# Patient Record
Sex: FEMALE | Race: WHITE | NOT HISPANIC OR LATINO | URBAN - METROPOLITAN AREA
[De-identification: names, ages, dates, MRNs, and addresses within clinical notes are randomized per-mention and may not be internally consistent; named-entity substitution may affect disease eponyms.]

---

## 2020-07-25 ENCOUNTER — TELEPHONE ENCOUNTER (OUTPATIENT)
Dept: URBAN - METROPOLITAN AREA CLINIC 13 | Facility: CLINIC | Age: 60
End: 2020-07-25

## 2020-07-25 RX ORDER — BIOTIN 2500 MCG
TAKE 1 CAPSULE DAILY CAPSULE ORAL
Refills: 0 | OUTPATIENT
End: 2019-05-22

## 2020-07-25 RX ORDER — GABAPENTIN 300 MG/1
TAKE 1 CAPSULE 3 TIMES DAILY CAPSULE ORAL
Refills: 0 | OUTPATIENT
End: 2016-05-31

## 2020-07-25 RX ORDER — KETOROLAC TROMETHAMINE 10 MG/1
TAKE 1 TABLET EVERY 6 HOURS WITH FOOD TABLET, FILM COATED ORAL
Refills: 0 | OUTPATIENT
End: 2016-04-05

## 2020-07-25 RX ORDER — BACLOFEN 10 MG/1
TAKE 1 TABLET WEEKLY PRN TABLET ORAL
Qty: 3 | Refills: 0 | OUTPATIENT
Start: 2013-05-01 | End: 2016-05-31

## 2020-07-25 RX ORDER — SUCRALFATE 1 G/1
TAKE 1 TABLET 4 TIMES DAILY TABLET ORAL
Refills: 0 | OUTPATIENT
Start: 2013-01-21 | End: 2013-07-02

## 2020-07-25 RX ORDER — HYDROCODONE BITARTRATE AND HOMATROPINE METHYLBROMIDE 5; 1.5 MG/5ML; MG/5ML
SYRUP ORAL
Qty: 240 | Refills: 0 | OUTPATIENT
Start: 2013-06-20 | End: 2015-01-13

## 2020-07-25 RX ORDER — CELECOXIB 50 MG/1
TAKE 1 CAPSULE BEDTIME CAPSULE ORAL
Refills: 0 | OUTPATIENT
End: 2013-07-02

## 2020-07-25 RX ORDER — VARENICLINE TARTRATE 1 MG/1
TAKE 1 TABLET TWICE DAILY TABLET, FILM COATED ORAL
Refills: 0 | OUTPATIENT
End: 2018-07-13

## 2020-07-25 RX ORDER — PANTOPRAZOLE SODIUM 40 MG/1
TAKE 1 TABLET DAILY TABLET, DELAYED RELEASE ORAL
Qty: 30 | Refills: 2 | OUTPATIENT
Start: 2016-05-31 | End: 2018-07-13

## 2020-07-25 RX ORDER — OMEPRAZOLE 20 MG/1
TAKE 1 TABLET DAILY TABLET, DELAYED RELEASE ORAL
Refills: 0 | OUTPATIENT
Start: 2008-06-03 | End: 2009-08-04

## 2020-07-25 RX ORDER — OMEPRAZOLE 20 MG/1
TAKE ONE CAPSULE BY MOUTH TWICE A DAY CAPSULE, DELAYED RELEASE ORAL
Qty: 60 | Refills: 3 | OUTPATIENT
Start: 2012-01-27 | End: 2016-05-31

## 2020-07-25 RX ORDER — MELOXICAM 15 MG/1
TAKE 1 TABLET DAILY AS NEEDED TABLET ORAL
Refills: 0 | OUTPATIENT
End: 2019-05-22

## 2020-07-25 RX ORDER — SACCHAROMYCES BOULARDII 250 MG
USE AS DIRECTED CAPSULE ORAL
Refills: 0 | OUTPATIENT
End: 2018-07-13

## 2020-07-25 RX ORDER — MELOXICAM 15 MG/1
TAKE 1 TABLET DAILY TABLET ORAL
Refills: 0 | OUTPATIENT
Start: 2008-06-03 | End: 2010-03-16

## 2020-07-25 RX ORDER — CALCIUM CITRATE/VITAMIN D3 315MG-6.25
TAKE 1 TABLET  TWICE DAILY TABLET ORAL
Refills: 0 | OUTPATIENT
Start: 2006-08-01 | End: 2016-01-20

## 2020-07-25 RX ORDER — MELOXICAM 15 MG/1
TAKE 1 TABLET DAILY TABLET ORAL
Refills: 0 | OUTPATIENT
Start: 2010-03-16 | End: 2011-09-02

## 2020-07-25 RX ORDER — DICYCLOMINE HYDROCHLORIDE 10 MG/1
TAKE 1 CAPSULE EVERY 6 HOURS AS NEEDED CAPSULE ORAL
Qty: 45 | Refills: 0 | OUTPATIENT
Start: 2016-01-20 | End: 2016-04-05

## 2020-07-25 RX ORDER — CYANOCOBALAMIN (VITAMIN B-12) 1000 MCG
TAKE 1 TABLET DAILY AS DIRECTED TABLET, EXTENDED RELEASE ORAL
Refills: 0 | OUTPATIENT
End: 2016-01-20

## 2020-07-25 RX ORDER — ZOLEDRONIC ACID 5 MG/100ML
USE AS DIRECTED INJECTION, SOLUTION INTRAVENOUS
Refills: 0 | OUTPATIENT
Start: 2013-02-21 | End: 2016-01-20

## 2020-07-25 RX ORDER — KETOROLAC TROMETHAMINE 10 MG/1
TABLET, FILM COATED ORAL
Qty: 30 | Refills: 0 | OUTPATIENT
Start: 2013-05-01 | End: 2015-01-13

## 2020-07-26 ENCOUNTER — TELEPHONE ENCOUNTER (OUTPATIENT)
Dept: URBAN - METROPOLITAN AREA CLINIC 13 | Facility: CLINIC | Age: 60
End: 2020-07-26

## 2020-07-26 RX ORDER — ONDANSETRON HYDROCHLORIDE 4 MG/1
TAKE 1 TABLET BY MOUTH EVERY 8 HOURS AS NEEDED FOR NAUSEA OR VOMITING TABLET, FILM COATED ORAL
Qty: 30 | Refills: 3 | Status: ACTIVE | COMMUNITY
Start: 2019-05-22

## 2020-07-26 RX ORDER — NYSTATIN 100000 [USP'U]/G
APPLY TO AFFECTED AREA 3 TIMES A DAY POWDER TOPICAL
Qty: 15 | Refills: 0 | Status: ACTIVE | COMMUNITY
Start: 2019-05-25

## 2020-07-26 RX ORDER — VANCOMYCIN HYDROCHLORIDE 125 MG/1
TAKE 1 CAPSULE EVERY SIX HOURS FOR 10 DAYS CAPSULE ORAL
Qty: 40 | Refills: 0 | Status: ACTIVE | COMMUNITY
Start: 2019-05-16

## 2020-07-26 RX ORDER — OMEPRAZOLE 40 MG/1
TAKE ONE CAPSULE BY MOUTH EVERY DAY CAPSULE, DELAYED RELEASE ORAL
Qty: 30 | Refills: 2 | Status: ACTIVE | COMMUNITY
Start: 2016-06-08

## 2020-07-26 RX ORDER — ALBUTEROL SULFATE 90 UG/1
AEROSOL, METERED RESPIRATORY (INHALATION)
Qty: 18 | Refills: 0 | Status: ACTIVE | COMMUNITY
Start: 2012-03-06

## 2020-07-26 RX ORDER — DICYCLOMINE HYDROCHLORIDE 20 MG/1
TAKE 1 TABLET EVERY 6-8 HOURS PRN TABLET ORAL
Refills: 0 | Status: ACTIVE | COMMUNITY

## 2020-07-26 RX ORDER — CIPROFLOXACIN HYDROCHLORIDE 500 MG/1
TAKE 1 TABLET TWICE DAILY TABLET, FILM COATED ORAL
Refills: 0 | Status: ACTIVE | COMMUNITY

## 2020-07-26 RX ORDER — PROMETHAZINE HYDROCHLORIDE AND DEXTROMETHORPHAN HYDROBROMIDE 6.25; 15 MG/5ML; MG/5ML
SOLUTION ORAL
Qty: 180 | Refills: 0 | Status: ACTIVE | COMMUNITY
Start: 2012-03-06

## 2020-07-26 RX ORDER — HYOSCYAMINE SULFATE 0.12 MG/1
PLACE 1 TABLET EVERY 6 HOURS PRN TABLET, ORALLY DISINTEGRATING ORAL
Qty: 60 | Refills: 3 | Status: ACTIVE | COMMUNITY
Start: 2019-05-22

## 2020-07-26 RX ORDER — NITROFURANTOIN MONOHYDRATE/MACROCRYSTALLINE 25; 75 MG/1; MG/1
CAPSULE ORAL
Qty: 20 | Refills: 0 | Status: ACTIVE | COMMUNITY
Start: 2012-08-14

## 2020-07-26 RX ORDER — METRONIDAZOLE 500 MG/1
TAKE 1 TABLET EVERY 8 HOURS TABLET, FILM COATED ORAL
Qty: 30 | Refills: 1 | Status: ACTIVE | COMMUNITY

## 2020-07-26 RX ORDER — ZOLEDRONIC ACID 5 MG/100ML
INJECTION, SOLUTION INTRAVENOUS
Qty: 100 | Refills: 0 | Status: ACTIVE | COMMUNITY
Start: 2012-05-29

## 2020-07-26 RX ORDER — POLYETHYLENE GLYCOL 3350, SODIUM CHLORIDE, SODIUM BICARBONATE AND POTASSIUM CHLORIDE WITH LEMON FLAVOR 420; 11.2; 5.72; 1.48 G/4L; G/4L; G/4L; G/4L
MIX CONTENTS PER PKG DIRECTIONS. DAY PRIOR TO PROCEDURE BEGIN DRINKING 1/2 SOULTION @ 5PM, COMPLETE  SECOND 1/2 6HR PRIOR TO PROCEDURE POWDER, FOR SOLUTION ORAL
Qty: 1 | Refills: 0 | Status: ACTIVE | COMMUNITY
Start: 2019-05-22

## 2020-07-26 RX ORDER — AMOXICILLIN AND CLAVULANATE POTASSIUM 875; 125 MG/1; MG/1
TABLET, FILM COATED ORAL
Qty: 20 | Refills: 0 | Status: ACTIVE | COMMUNITY
Start: 2012-03-06

## 2020-07-26 RX ORDER — FLUCONAZOLE 150 MG/1
TAKE 1 TABLET BY MOUTH EVERY DAY FOR 5 DAYS TABLET ORAL
Qty: 5 | Refills: 0 | Status: ACTIVE | COMMUNITY
Start: 2019-05-25

## 2020-07-26 RX ORDER — NYSTATIN 100000 [USP'U]/ML
SWISH AND SWALLOW 15 MLS (1 TABLESPOONFUL) BY MOUTH 4 TIMES A DAY SUSPENSION ORAL
Qty: 420 | Refills: 0 | Status: ACTIVE | COMMUNITY
Start: 2019-05-25

## 2020-07-26 RX ORDER — BENZONATATE 100 MG/1
CAPSULE ORAL
Qty: 30 | Refills: 0 | Status: ACTIVE | COMMUNITY
Start: 2013-06-25

## 2020-07-26 RX ORDER — NITROFURANTOIN MONOHYDRATE/MACROCRYSTALLINE 25; 75 MG/1; MG/1
CAPSULE ORAL
Qty: 20 | Refills: 0 | Status: ACTIVE | COMMUNITY
Start: 2012-11-06

## 2020-07-26 RX ORDER — LEVOFLOXACIN 500 MG/1
TABLET, FILM COATED ORAL
Qty: 7 | Refills: 0 | Status: ACTIVE | COMMUNITY
Start: 2013-06-20

## 2020-11-04 ENCOUNTER — WEB ENCOUNTER (OUTPATIENT)
Dept: URBAN - METROPOLITAN AREA CLINIC 113 | Facility: CLINIC | Age: 60
End: 2020-11-04

## 2020-11-04 ENCOUNTER — LAB OUTSIDE AN ENCOUNTER (OUTPATIENT)
Dept: URBAN - METROPOLITAN AREA CLINIC 113 | Facility: CLINIC | Age: 60
End: 2020-11-04

## 2020-11-04 ENCOUNTER — OFFICE VISIT (OUTPATIENT)
Dept: URBAN - METROPOLITAN AREA CLINIC 113 | Facility: CLINIC | Age: 60
End: 2020-11-04
Payer: COMMERCIAL

## 2020-11-04 VITALS
HEART RATE: 84 BPM | WEIGHT: 183 LBS | RESPIRATION RATE: 18 BRPM | SYSTOLIC BLOOD PRESSURE: 129 MMHG | TEMPERATURE: 98.7 F | BODY MASS INDEX: 30.49 KG/M2 | HEIGHT: 65 IN | DIASTOLIC BLOOD PRESSURE: 87 MMHG

## 2020-11-04 DIAGNOSIS — R19.7 DIARRHEA: ICD-10-CM

## 2020-11-04 DIAGNOSIS — K21.9 GERD: ICD-10-CM

## 2020-11-04 DIAGNOSIS — K22.70 BARRETT ESOPHAGUS: ICD-10-CM

## 2020-11-04 PROBLEM — 235595009 GASTROESOPHAGEAL REFLUX DISEASE: Status: ACTIVE | Noted: 2020-11-04

## 2020-11-04 PROBLEM — 62315008 DIARRHEA: Status: ACTIVE | Noted: 2020-11-04

## 2020-11-04 PROCEDURE — G8483 FLU IMM NO ADMIN DOC REA: HCPCS | Performed by: NURSE PRACTITIONER

## 2020-11-04 PROCEDURE — 99214 OFFICE O/P EST MOD 30 MIN: CPT | Performed by: NURSE PRACTITIONER

## 2020-11-04 RX ORDER — CHOLECALCIFEROL (VITAMIN D3) 1MM UNIT/G
AS DIRECTED LIQUID (ML) MISCELLANEOUS
Status: ACTIVE | COMMUNITY

## 2020-11-04 RX ORDER — OMEPRAZOLE 40 MG/1
TAKE ONE CAPSULE BY MOUTH EVERY DAY CAPSULE, DELAYED RELEASE ORAL ONCE A DAY
Qty: 90 | Refills: 3 | OUTPATIENT

## 2020-11-04 RX ORDER — FLUCONAZOLE 150 MG/1
TAKE 1 TABLET BY MOUTH EVERY DAY FOR 5 DAYS TABLET ORAL
Qty: 5 | Refills: 0 | Status: ON HOLD | COMMUNITY
Start: 2019-05-25

## 2020-11-04 RX ORDER — LEVOFLOXACIN 500 MG/1
TABLET, FILM COATED ORAL
Qty: 7 | Refills: 0 | Status: ON HOLD | COMMUNITY
Start: 2013-06-20

## 2020-11-04 RX ORDER — DICYCLOMINE HYDROCHLORIDE 20 MG/1
TAKE 1 TABLET EVERY 6-8 HOURS PRN TABLET ORAL
Refills: 0 | Status: ON HOLD | COMMUNITY

## 2020-11-04 RX ORDER — NYSTATIN 100000 [USP'U]/ML
SWISH AND SWALLOW 15 MLS (1 TABLESPOONFUL) BY MOUTH 4 TIMES A DAY SUSPENSION ORAL
Qty: 420 | Refills: 0 | Status: ON HOLD | COMMUNITY
Start: 2019-05-25

## 2020-11-04 RX ORDER — POLYETHYLENE GLYCOL 3350, SODIUM CHLORIDE, SODIUM BICARBONATE AND POTASSIUM CHLORIDE WITH LEMON FLAVOR 420; 11.2; 5.72; 1.48 G/4L; G/4L; G/4L; G/4L
MIX CONTENTS PER PKG DIRECTIONS. DAY PRIOR TO PROCEDURE BEGIN DRINKING 1/2 SOULTION @ 5PM, COMPLETE  SECOND 1/2 6HR PRIOR TO PROCEDURE POWDER, FOR SOLUTION ORAL
Qty: 1 | Refills: 0 | Status: ON HOLD | COMMUNITY
Start: 2019-05-22

## 2020-11-04 RX ORDER — ALBUTEROL SULFATE 90 UG/1
AEROSOL, METERED RESPIRATORY (INHALATION)
Qty: 18 | Refills: 0 | Status: ON HOLD | COMMUNITY
Start: 2012-03-06

## 2020-11-04 RX ORDER — NITROFURANTOIN MONOHYDRATE/MACROCRYSTALLINE 25; 75 MG/1; MG/1
CAPSULE ORAL
Qty: 20 | Refills: 0 | Status: ON HOLD | COMMUNITY
Start: 2012-08-14

## 2020-11-04 RX ORDER — METRONIDAZOLE 500 MG/1
TAKE 1 TABLET EVERY 8 HOURS TABLET, FILM COATED ORAL
Qty: 30 | Refills: 1 | Status: ON HOLD | COMMUNITY

## 2020-11-04 RX ORDER — AMOXICILLIN AND CLAVULANATE POTASSIUM 875; 125 MG/1; MG/1
TABLET, FILM COATED ORAL
Qty: 20 | Refills: 0 | Status: ON HOLD | COMMUNITY
Start: 2012-03-06

## 2020-11-04 RX ORDER — NYSTATIN 100000 [USP'U]/G
APPLY TO AFFECTED AREA 3 TIMES A DAY POWDER TOPICAL
Qty: 15 | Refills: 0 | Status: ON HOLD | COMMUNITY
Start: 2019-05-25

## 2020-11-04 RX ORDER — CIPROFLOXACIN HYDROCHLORIDE 500 MG/1
TAKE 1 TABLET TWICE DAILY TABLET, FILM COATED ORAL
Refills: 0 | Status: ON HOLD | COMMUNITY

## 2020-11-04 RX ORDER — PROMETHAZINE HYDROCHLORIDE AND DEXTROMETHORPHAN HYDROBROMIDE 6.25; 15 MG/5ML; MG/5ML
SOLUTION ORAL
Qty: 180 | Refills: 0 | Status: ON HOLD | COMMUNITY
Start: 2012-03-06

## 2020-11-04 RX ORDER — OMEPRAZOLE 40 MG/1
TAKE ONE CAPSULE BY MOUTH EVERY DAY CAPSULE, DELAYED RELEASE ORAL
Qty: 30 | Refills: 2 | Status: ACTIVE | COMMUNITY
Start: 2016-06-08

## 2020-11-04 RX ORDER — MULTIVIT-MIN/IRON/FOLIC ACID/K 18-600-40
AS DIRECTED CAPSULE ORAL
Status: ACTIVE | COMMUNITY

## 2020-11-04 RX ORDER — ZOLEDRONIC ACID 5 MG/100ML
INJECTION, SOLUTION INTRAVENOUS
Qty: 100 | Refills: 0 | Status: ON HOLD | COMMUNITY
Start: 2012-05-29

## 2020-11-04 RX ORDER — HYOSCYAMINE SULFATE 0.12 MG/1
PLACE 1 TABLET EVERY 6 HOURS PRN TABLET, ORALLY DISINTEGRATING ORAL
Qty: 60 | Refills: 3 | Status: ON HOLD | COMMUNITY
Start: 2019-05-22

## 2020-11-04 RX ORDER — BENZONATATE 100 MG/1
CAPSULE ORAL
Qty: 30 | Refills: 0 | Status: ON HOLD | COMMUNITY
Start: 2013-06-25

## 2020-11-04 RX ORDER — ONDANSETRON HYDROCHLORIDE 4 MG/1
TAKE 1 TABLET BY MOUTH EVERY 8 HOURS AS NEEDED FOR NAUSEA OR VOMITING TABLET, FILM COATED ORAL
Qty: 30 | Refills: 3 | Status: ON HOLD | COMMUNITY
Start: 2019-05-22

## 2020-11-04 RX ORDER — VANCOMYCIN HYDROCHLORIDE 125 MG/1
TAKE 1 CAPSULE EVERY SIX HOURS FOR 10 DAYS CAPSULE ORAL
Qty: 40 | Refills: 0 | Status: ON HOLD | COMMUNITY
Start: 2019-05-16

## 2020-11-04 NOTE — HPI-TODAY'S VISIT:
Ms. Sanchez is a 60-year-old female with a history of GERD, short segment Garcia's esophagus, and diarrhea, presenting for follow-up regarding GERD. She was last seen in the office on 5/22/2019 regarding lower abdominal discomfort with radiologic evidence of diverticulitis which was improving with Cipro and Flagyl.  She was provided an antispasmodic to use as needed.  Additionally, she was planned for colonoscopy in 6 to 8 weeks to allow for healing from diverticulitis, to evaluate for colorectal pathology.  Regarding her history of short segment Garcia's esophagus, she was noted to be due for surveillance upper endoscopy in 2020. Colonoscopy was performed 7/18/2019.  Findings included good bowel preparation normal ileum, nonbleeding internal hemorrhoids, diverticulosis, tortuous colon, normal mucosa in the entire examined colon status post biopsies, and removal of a 5 mm polyp from the descending colon with pathology showing hyperplastic tissue.  Random colon biopsies revealed no significant abnormality.  She reports that she is here for medication refill. She is taking omeprazole 40 mg daily. There is no dysphagia. She rarely has breakthrough symptoms; yesterday she ate smoked chicken and then had breakthrough symptoms. This occurs maybe once every 2 weeks. She avoids late night meals. Breakthrough symptoms are relieved with as needed Tums.  There is no nausea or vomiting. She reports periodic black stools. Currently, stools are a dark green color. No hematemesis or red blood per rectum. She reports urgency with diarrhea after meals. There have not been any identified triggers. This has been ongoing since prior to cholecystectomy. She is not on any fiber supplement.

## 2020-11-12 ENCOUNTER — ERX REFILL RESPONSE (OUTPATIENT)
Age: 60
End: 2020-11-12

## 2020-11-12 RX ORDER — OMEPRAZOLE 40 MG/1
TAKE ONE CAPSULE BY MOUTH EVERY DAY CAPSULE, DELAYED RELEASE ORAL
Qty: 30 | Refills: 2

## 2020-11-24 ENCOUNTER — TELEPHONE ENCOUNTER (OUTPATIENT)
Dept: URBAN - METROPOLITAN AREA CLINIC 113 | Facility: CLINIC | Age: 60
End: 2020-11-24

## 2020-12-04 ENCOUNTER — TELEPHONE ENCOUNTER (OUTPATIENT)
Dept: URBAN - METROPOLITAN AREA CLINIC 113 | Facility: CLINIC | Age: 60
End: 2020-12-04

## 2020-12-11 ENCOUNTER — OFFICE VISIT (OUTPATIENT)
Dept: URBAN - METROPOLITAN AREA MEDICAL CENTER 2 | Facility: MEDICAL CENTER | Age: 60
End: 2020-12-11

## 2021-01-29 ENCOUNTER — OFFICE VISIT (OUTPATIENT)
Dept: URBAN - METROPOLITAN AREA MEDICAL CENTER 2 | Facility: MEDICAL CENTER | Age: 61
End: 2021-01-29
Payer: COMMERCIAL

## 2021-01-29 DIAGNOSIS — K21.9 ACID REFLUX: ICD-10-CM

## 2021-01-29 PROCEDURE — 992 APS NON BILLABLE: Performed by: INTERNAL MEDICINE

## 2021-01-29 RX ORDER — METRONIDAZOLE 500 MG/1
TAKE 1 TABLET EVERY 8 HOURS TABLET, FILM COATED ORAL
Qty: 30 | Refills: 1 | Status: ON HOLD | COMMUNITY

## 2021-01-29 RX ORDER — AMOXICILLIN AND CLAVULANATE POTASSIUM 875; 125 MG/1; MG/1
TABLET, FILM COATED ORAL
Qty: 20 | Refills: 0 | Status: ON HOLD | COMMUNITY
Start: 2012-03-06

## 2021-01-29 RX ORDER — VANCOMYCIN HYDROCHLORIDE 125 MG/1
TAKE 1 CAPSULE EVERY SIX HOURS FOR 10 DAYS CAPSULE ORAL
Qty: 40 | Refills: 0 | Status: ON HOLD | COMMUNITY
Start: 2019-05-16

## 2021-01-29 RX ORDER — NYSTATIN 100000 [USP'U]/G
APPLY TO AFFECTED AREA 3 TIMES A DAY POWDER TOPICAL
Qty: 15 | Refills: 0 | Status: ON HOLD | COMMUNITY
Start: 2019-05-25

## 2021-01-29 RX ORDER — ALBUTEROL SULFATE 90 UG/1
AEROSOL, METERED RESPIRATORY (INHALATION)
Qty: 18 | Refills: 0 | Status: ON HOLD | COMMUNITY
Start: 2012-03-06

## 2021-01-29 RX ORDER — DICYCLOMINE HYDROCHLORIDE 20 MG/1
TAKE 1 TABLET EVERY 6-8 HOURS PRN TABLET ORAL
Refills: 0 | Status: ON HOLD | COMMUNITY

## 2021-01-29 RX ORDER — NITROFURANTOIN MONOHYDRATE/MACROCRYSTALLINE 25; 75 MG/1; MG/1
CAPSULE ORAL
Qty: 20 | Refills: 0 | Status: ON HOLD | COMMUNITY
Start: 2012-08-14

## 2021-01-29 RX ORDER — CIPROFLOXACIN HYDROCHLORIDE 500 MG/1
TAKE 1 TABLET TWICE DAILY TABLET, FILM COATED ORAL
Refills: 0 | Status: ON HOLD | COMMUNITY

## 2021-01-29 RX ORDER — BENZONATATE 100 MG/1
CAPSULE ORAL
Qty: 30 | Refills: 0 | Status: ON HOLD | COMMUNITY
Start: 2013-06-25

## 2021-01-29 RX ORDER — PROMETHAZINE HYDROCHLORIDE AND DEXTROMETHORPHAN HYDROBROMIDE 6.25; 15 MG/5ML; MG/5ML
SOLUTION ORAL
Qty: 180 | Refills: 0 | Status: ON HOLD | COMMUNITY
Start: 2012-03-06

## 2021-01-29 RX ORDER — OMEPRAZOLE 40 MG/1
TAKE ONE CAPSULE BY MOUTH EVERY DAY CAPSULE, DELAYED RELEASE ORAL
Qty: 30 | Refills: 2 | Status: ACTIVE | COMMUNITY

## 2021-01-29 RX ORDER — ONDANSETRON HYDROCHLORIDE 4 MG/1
TAKE 1 TABLET BY MOUTH EVERY 8 HOURS AS NEEDED FOR NAUSEA OR VOMITING TABLET, FILM COATED ORAL
Qty: 30 | Refills: 3 | Status: ON HOLD | COMMUNITY
Start: 2019-05-22

## 2021-01-29 RX ORDER — MULTIVIT-MIN/IRON/FOLIC ACID/K 18-600-40
AS DIRECTED CAPSULE ORAL
Status: ACTIVE | COMMUNITY

## 2021-01-29 RX ORDER — NYSTATIN 100000 [USP'U]/ML
SWISH AND SWALLOW 15 MLS (1 TABLESPOONFUL) BY MOUTH 4 TIMES A DAY SUSPENSION ORAL
Qty: 420 | Refills: 0 | Status: ON HOLD | COMMUNITY
Start: 2019-05-25

## 2021-01-29 RX ORDER — ZOLEDRONIC ACID 5 MG/100ML
INJECTION, SOLUTION INTRAVENOUS
Qty: 100 | Refills: 0 | Status: ON HOLD | COMMUNITY
Start: 2012-05-29

## 2021-01-29 RX ORDER — CHOLECALCIFEROL (VITAMIN D3) 1MM UNIT/G
AS DIRECTED LIQUID (ML) MISCELLANEOUS
Status: ACTIVE | COMMUNITY

## 2021-01-29 RX ORDER — HYOSCYAMINE SULFATE 0.12 MG/1
PLACE 1 TABLET EVERY 6 HOURS PRN TABLET, ORALLY DISINTEGRATING ORAL
Qty: 60 | Refills: 3 | Status: ON HOLD | COMMUNITY
Start: 2019-05-22

## 2021-01-29 RX ORDER — LEVOFLOXACIN 500 MG/1
TABLET, FILM COATED ORAL
Qty: 7 | Refills: 0 | Status: ON HOLD | COMMUNITY
Start: 2013-06-20

## 2021-01-29 RX ORDER — POLYETHYLENE GLYCOL 3350, SODIUM CHLORIDE, SODIUM BICARBONATE AND POTASSIUM CHLORIDE WITH LEMON FLAVOR 420; 11.2; 5.72; 1.48 G/4L; G/4L; G/4L; G/4L
MIX CONTENTS PER PKG DIRECTIONS. DAY PRIOR TO PROCEDURE BEGIN DRINKING 1/2 SOULTION @ 5PM, COMPLETE  SECOND 1/2 6HR PRIOR TO PROCEDURE POWDER, FOR SOLUTION ORAL
Qty: 1 | Refills: 0 | Status: ON HOLD | COMMUNITY
Start: 2019-05-22

## 2021-01-29 RX ORDER — FLUCONAZOLE 150 MG/1
TAKE 1 TABLET BY MOUTH EVERY DAY FOR 5 DAYS TABLET ORAL
Qty: 5 | Refills: 0 | Status: ON HOLD | COMMUNITY
Start: 2019-05-25

## 2021-02-02 ENCOUNTER — TELEPHONE ENCOUNTER (OUTPATIENT)
Dept: URBAN - METROPOLITAN AREA CLINIC 113 | Facility: CLINIC | Age: 61
End: 2021-02-02

## 2021-02-05 ENCOUNTER — OFFICE VISIT (OUTPATIENT)
Dept: URBAN - METROPOLITAN AREA CLINIC 113 | Facility: CLINIC | Age: 61
End: 2021-02-05

## 2021-02-16 ENCOUNTER — ERX REFILL RESPONSE (OUTPATIENT)
Age: 61
End: 2021-02-16

## 2021-02-16 RX ORDER — OMEPRAZOLE 40 MG/1
TAKE ONE CAPSULE BY MOUTH EVERY DAY CAPSULE, DELAYED RELEASE ORAL
Qty: 30 | Refills: 2

## 2021-02-23 PROBLEM — 302914006 BARRETT ESOPHAGUS: Status: ACTIVE | Noted: 2020-11-04

## 2021-02-24 ENCOUNTER — OFFICE VISIT (OUTPATIENT)
Dept: URBAN - METROPOLITAN AREA MEDICAL CENTER 2 | Facility: MEDICAL CENTER | Age: 61
End: 2021-02-24
Payer: COMMERCIAL

## 2021-02-24 DIAGNOSIS — K44.9 DIAPHRAGMATIC HERNIA: ICD-10-CM

## 2021-02-24 DIAGNOSIS — K21.00 ALKALINE REFLUX ESOPHAGITIS: ICD-10-CM

## 2021-02-24 DIAGNOSIS — K22.70 BARRETT'S ESOPHAGUS: ICD-10-CM

## 2021-02-24 PROCEDURE — 43239 EGD BIOPSY SINGLE/MULTIPLE: CPT | Performed by: INTERNAL MEDICINE

## 2021-03-12 ENCOUNTER — OFFICE VISIT (OUTPATIENT)
Dept: URBAN - METROPOLITAN AREA CLINIC 113 | Facility: CLINIC | Age: 61
End: 2021-03-12

## 2021-04-12 ENCOUNTER — ERX REFILL RESPONSE (OUTPATIENT)
Dept: URBAN - METROPOLITAN AREA CLINIC 72 | Facility: CLINIC | Age: 61
End: 2021-04-12

## 2021-04-12 RX ORDER — OMEPRAZOLE 40 MG/1
TAKE ONE CAPSULE BY MOUTH EVERY DAY CAPSULE, DELAYED RELEASE ORAL
Qty: 30 | Refills: 8

## 2021-05-31 ENCOUNTER — ERX REFILL RESPONSE (OUTPATIENT)
Dept: URBAN - METROPOLITAN AREA CLINIC 72 | Facility: CLINIC | Age: 61
End: 2021-05-31

## 2021-05-31 RX ORDER — OMEPRAZOLE 40 MG/1
TAKE ONE CAPSULE BY MOUTH EVERY DAY CAPSULE, DELAYED RELEASE ORAL
Qty: 30 | Refills: 5

## 2021-08-31 ENCOUNTER — OFFICE VISIT (OUTPATIENT)
Dept: URBAN - METROPOLITAN AREA CLINIC 107 | Facility: CLINIC | Age: 61
End: 2021-08-31
Payer: COMMERCIAL

## 2021-08-31 VITALS
SYSTOLIC BLOOD PRESSURE: 145 MMHG | BODY MASS INDEX: 30.49 KG/M2 | RESPIRATION RATE: 18 BRPM | TEMPERATURE: 98.2 F | HEIGHT: 65 IN | WEIGHT: 183 LBS | DIASTOLIC BLOOD PRESSURE: 90 MMHG | HEART RATE: 78 BPM

## 2021-08-31 DIAGNOSIS — R10.13 EPIGASTRIC ABDOMINAL PAIN: ICD-10-CM

## 2021-08-31 PROCEDURE — 99213 OFFICE O/P EST LOW 20 MIN: CPT | Performed by: INTERNAL MEDICINE

## 2021-08-31 RX ORDER — POLYETHYLENE GLYCOL 3350, SODIUM CHLORIDE, SODIUM BICARBONATE AND POTASSIUM CHLORIDE WITH LEMON FLAVOR 420; 11.2; 5.72; 1.48 G/4L; G/4L; G/4L; G/4L
MIX CONTENTS PER PKG DIRECTIONS. DAY PRIOR TO PROCEDURE BEGIN DRINKING 1/2 SOULTION @ 5PM, COMPLETE  SECOND 1/2 6HR PRIOR TO PROCEDURE POWDER, FOR SOLUTION ORAL
Qty: 1 | Refills: 0 | Status: ON HOLD | COMMUNITY
Start: 2019-05-22

## 2021-08-31 RX ORDER — PROMETHAZINE HYDROCHLORIDE AND DEXTROMETHORPHAN HYDROBROMIDE 6.25; 15 MG/5ML; MG/5ML
SOLUTION ORAL
Qty: 180 | Refills: 0 | Status: ON HOLD | COMMUNITY
Start: 2012-03-06

## 2021-08-31 RX ORDER — FLUCONAZOLE 150 MG/1
TAKE 1 TABLET BY MOUTH EVERY DAY FOR 5 DAYS TABLET ORAL
Qty: 5 | Refills: 0 | Status: ON HOLD | COMMUNITY
Start: 2019-05-25

## 2021-08-31 RX ORDER — NYSTATIN 100000 [USP'U]/ML
SWISH AND SWALLOW 15 MLS (1 TABLESPOONFUL) BY MOUTH 4 TIMES A DAY SUSPENSION ORAL
Qty: 420 | Refills: 0 | Status: ON HOLD | COMMUNITY
Start: 2019-05-25

## 2021-08-31 RX ORDER — MULTIVIT-MIN/IRON/FOLIC ACID/K 18-600-40
AS DIRECTED CAPSULE ORAL
Status: ACTIVE | COMMUNITY

## 2021-08-31 RX ORDER — ONDANSETRON HYDROCHLORIDE 4 MG/1
TAKE 1 TABLET BY MOUTH EVERY 8 HOURS AS NEEDED FOR NAUSEA OR VOMITING TABLET, FILM COATED ORAL
Qty: 30 | Refills: 3 | Status: ON HOLD | COMMUNITY
Start: 2019-05-22

## 2021-08-31 RX ORDER — FAMOTIDINE 20 MG/1
1 TABLET AT BEDTIME AS NEEDED TABLET, FILM COATED ORAL ONCE A DAY
OUTPATIENT

## 2021-08-31 RX ORDER — HYOSCYAMINE SULFATE 0.12 MG/1
PLACE 1 TABLET EVERY 6 HOURS PRN TABLET, ORALLY DISINTEGRATING ORAL
OUTPATIENT
Start: 2019-05-22

## 2021-08-31 RX ORDER — METRONIDAZOLE 500 MG/1
TAKE 1 TABLET EVERY 8 HOURS TABLET, FILM COATED ORAL
Qty: 30 | Refills: 1 | Status: ON HOLD | COMMUNITY

## 2021-08-31 RX ORDER — HYOSCYAMINE SULFATE 0.12 MG/1
PLACE 1 TABLET EVERY 6 HOURS PRN TABLET, ORALLY DISINTEGRATING ORAL
Qty: 60 | Refills: 3 | Status: ACTIVE | COMMUNITY
Start: 2019-05-22

## 2021-08-31 RX ORDER — VANCOMYCIN HYDROCHLORIDE 125 MG/1
TAKE 1 CAPSULE EVERY SIX HOURS FOR 10 DAYS CAPSULE ORAL
Qty: 40 | Refills: 0 | Status: ON HOLD | COMMUNITY
Start: 2019-05-16

## 2021-08-31 RX ORDER — OMEPRAZOLE 40 MG/1
TAKE ONE CAPSULE BY MOUTH EVERY DAY CAPSULE, DELAYED RELEASE ORAL
Qty: 30 | Refills: 5 | Status: ACTIVE | COMMUNITY

## 2021-08-31 RX ORDER — OMEPRAZOLE 40 MG/1
TAKE ONE CAPSULE BY MOUTH EVERY DAY CAPSULE, DELAYED RELEASE ORAL
OUTPATIENT

## 2021-08-31 RX ORDER — SIMVASTATIN 40 MG/1
1 TABLET IN THE EVENING TABLET, FILM COATED ORAL ONCE A DAY
Status: ACTIVE | COMMUNITY

## 2021-08-31 RX ORDER — LEVOFLOXACIN 500 MG/1
TABLET, FILM COATED ORAL
Qty: 7 | Refills: 0 | Status: ON HOLD | COMMUNITY
Start: 2013-06-20

## 2021-08-31 RX ORDER — AMOXICILLIN AND CLAVULANATE POTASSIUM 875; 125 MG/1; MG/1
TABLET, FILM COATED ORAL
Qty: 20 | Refills: 0 | Status: ON HOLD | COMMUNITY
Start: 2012-03-06

## 2021-08-31 RX ORDER — ZOLEDRONIC ACID 5 MG/100ML
INJECTION, SOLUTION INTRAVENOUS
Qty: 100 | Refills: 0 | Status: ON HOLD | COMMUNITY
Start: 2012-05-29

## 2021-08-31 RX ORDER — DICYCLOMINE HYDROCHLORIDE 20 MG/1
TAKE 1 TABLET EVERY 6-8 HOURS PRN TABLET ORAL
Refills: 0 | Status: ON HOLD | COMMUNITY

## 2021-08-31 RX ORDER — ALBUTEROL SULFATE 90 UG/1
AEROSOL, METERED RESPIRATORY (INHALATION)
Qty: 18 | Refills: 0 | Status: ON HOLD | COMMUNITY
Start: 2012-03-06

## 2021-08-31 RX ORDER — NYSTATIN 100000 [USP'U]/G
APPLY TO AFFECTED AREA 3 TIMES A DAY POWDER TOPICAL
Qty: 15 | Refills: 0 | Status: ON HOLD | COMMUNITY
Start: 2019-05-25

## 2021-08-31 RX ORDER — BENZONATATE 100 MG/1
CAPSULE ORAL
Qty: 30 | Refills: 0 | Status: ON HOLD | COMMUNITY
Start: 2013-06-25

## 2021-08-31 RX ORDER — CHOLECALCIFEROL (VITAMIN D3) 1MM UNIT/G
AS DIRECTED LIQUID (ML) MISCELLANEOUS
Status: ACTIVE | COMMUNITY

## 2021-08-31 RX ORDER — NITROFURANTOIN MONOHYDRATE/MACROCRYSTALLINE 25; 75 MG/1; MG/1
CAPSULE ORAL
Qty: 20 | Refills: 0 | Status: ON HOLD | COMMUNITY
Start: 2012-08-14

## 2021-08-31 RX ORDER — FAMOTIDINE 20 MG/1
1 TABLET AT BEDTIME AS NEEDED TABLET, FILM COATED ORAL ONCE A DAY
Status: ACTIVE | COMMUNITY

## 2021-08-31 RX ORDER — CIPROFLOXACIN HYDROCHLORIDE 500 MG/1
TAKE 1 TABLET TWICE DAILY TABLET, FILM COATED ORAL
Refills: 0 | Status: ON HOLD | COMMUNITY

## 2021-08-31 NOTE — HPI-TODAY'S VISIT:
61-year-old woman with a history of GERD, Garcia's esophagus and diarrhea, presenting for follow-up. She was last seen in the office on 11/4/2020 with report of occasional reflux associated with dietary indiscretion.  For the most part, GERD was controlled with omeprazole 40 mg daily.  She was noted to be due for surveillance EGD for Garcia's esophagus.  This was scheduled at her convenience.  She was noted to have urgent diarrhea likely on the basis of functional diarrhea with some overlay of bile acid overflow.  She was recommended daily fiber supplementation. An EGD with dilatation was performed 2/24/2021.  LA grade a esophagitis.  Medium sized hiatal hernia.  Normal stomach.  Normal ampulla and examined duodenum.  Pathology showed squamocolumnar mucosa with acute and chronic inflammation and intestinal metaplasia.  There was no dysplasia identified.  Repeat EGD is recommended in 3 years. She was recently referred back to our office by Dr. Perez for evaluation of abdominal pain.  She tells me that for the last month, she has not been able to eat. She was recently started on Celebrex by rheumatology. After 1.5 weeks of taking Celebrex, she has been having upper abdominal pain.  She had lab work, and abdominal CT. She also reports that she lost her sister about 6 weeks ago. 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 She did stop the Celebrex when she started with abdominal pain. Her PCP has made some changes to her acid regimen without improvement in the abdominal pain.  She has since resumed omeprazole. She was given a prescription for Levsin one week ago with improvement in her abdominal pain. She tells me that she was evaluated by a GI doctor in [facility], who wanted to do an EGD. This was not completed because of the increasing COVID cases.  Labs 8/11/21: WBC 10.08, H/H 12.6/39, MCV 87.4, Plt 317, BUN 13, Crt 0.9, Na 138, K 3.8, AST 21, ALT 14, ALP 53, Tbili 0.4, [id number]  CTAP with IV contrast 8/11/21: Normal pancreas. No acute abdominopelvic process. May 23, 2019       Pcp Not In System  VIA       Patient: Saul Dai   YOB: 1947   Date of Visit: 5/23/2019       Dear Dr. System:    I saw your patient, Saul Dai, for an evaluation. Below are my notes for this visit with him.    If you have questions, please do not hesitate to call me.      Sincerely,        Ana Lilia Wyatt CNP        CC: No Recipients

## 2021-09-29 ENCOUNTER — OFFICE VISIT (OUTPATIENT)
Dept: URBAN - METROPOLITAN AREA CLINIC 113 | Facility: CLINIC | Age: 61
End: 2021-09-29

## 2021-10-01 ENCOUNTER — OFFICE VISIT (OUTPATIENT)
Dept: URBAN - METROPOLITAN AREA CLINIC 107 | Facility: CLINIC | Age: 61
End: 2021-10-01

## 2022-06-08 ENCOUNTER — ERX REFILL RESPONSE (OUTPATIENT)
Dept: URBAN - METROPOLITAN AREA CLINIC 113 | Facility: CLINIC | Age: 62
End: 2022-06-08

## 2022-06-08 RX ORDER — OMEPRAZOLE 40 MG/1
TAKE ONE CAPSULE BY MOUTH EVERY DAY CAPSULE, DELAYED RELEASE ORAL
Qty: 30 | Refills: 5 | OUTPATIENT

## 2022-06-08 RX ORDER — OMEPRAZOLE 40 MG/1
TAKE 1 CAPSULE BY MOUTH EVERY DAY CAPSULE, DELAYED RELEASE ORAL
Qty: 30 CAPSULE | Refills: 4 | OUTPATIENT

## 2022-09-30 ENCOUNTER — ERX REFILL RESPONSE (OUTPATIENT)
Dept: URBAN - METROPOLITAN AREA CLINIC 113 | Facility: CLINIC | Age: 62
End: 2022-09-30

## 2022-09-30 RX ORDER — OMEPRAZOLE 40 MG/1
TAKE 1 CAPSULE BY MOUTH EVERY DAY CAPSULE, DELAYED RELEASE ORAL
Qty: 30 CAPSULE | Refills: 5 | OUTPATIENT

## 2022-09-30 RX ORDER — OMEPRAZOLE 40 MG/1
TAKE 1 CAPSULE BY MOUTH EVERY DAY CAPSULE, DELAYED RELEASE ORAL
Qty: 30 CAPSULE | Refills: 6 | OUTPATIENT

## 2022-10-05 ENCOUNTER — OFFICE VISIT (OUTPATIENT)
Dept: URBAN - METROPOLITAN AREA CLINIC 113 | Facility: CLINIC | Age: 62
End: 2022-10-05
Payer: COMMERCIAL

## 2022-10-05 VITALS
HEART RATE: 82 BPM | TEMPERATURE: 98 F | HEIGHT: 65 IN | RESPIRATION RATE: 20 BRPM | WEIGHT: 183 LBS | BODY MASS INDEX: 30.49 KG/M2 | SYSTOLIC BLOOD PRESSURE: 119 MMHG | DIASTOLIC BLOOD PRESSURE: 82 MMHG

## 2022-10-05 DIAGNOSIS — R10.13 EPIGASTRIC ABDOMINAL PAIN: ICD-10-CM

## 2022-10-05 DIAGNOSIS — K52.9 CHRONIC DIARRHEA: ICD-10-CM

## 2022-10-05 DIAGNOSIS — Z12.11 COLON CANCER SCREENING: ICD-10-CM

## 2022-10-05 DIAGNOSIS — K22.70 BARRETT'S ESOPHAGUS WITHOUT DYSPLASIA: ICD-10-CM

## 2022-10-05 DIAGNOSIS — K21.9 GASTROESOPHAGEAL REFLUX DISEASE, UNSPECIFIED WHETHER ESOPHAGITIS PRESENT: ICD-10-CM

## 2022-10-05 PROBLEM — 235595009: Status: ACTIVE | Noted: 2022-10-05

## 2022-10-05 PROBLEM — 302914006: Status: ACTIVE | Noted: 2022-10-05

## 2022-10-05 PROCEDURE — 99214 OFFICE O/P EST MOD 30 MIN: CPT

## 2022-10-05 RX ORDER — PROMETHAZINE HYDROCHLORIDE AND DEXTROMETHORPHAN HYDROBROMIDE 6.25; 15 MG/5ML; MG/5ML
SOLUTION ORAL
Qty: 180 | Refills: 0 | Status: ON HOLD | COMMUNITY
Start: 2012-03-06

## 2022-10-05 RX ORDER — ZOLEDRONIC ACID 5 MG/100ML
INJECTION, SOLUTION INTRAVENOUS
Qty: 100 | Refills: 0 | Status: ON HOLD | COMMUNITY
Start: 2012-05-29

## 2022-10-05 RX ORDER — NYSTATIN 100000 [USP'U]/ML
SWISH AND SWALLOW 15 MLS (1 TABLESPOONFUL) BY MOUTH 4 TIMES A DAY SUSPENSION ORAL
Qty: 420 | Refills: 0 | Status: ON HOLD | COMMUNITY
Start: 2019-05-25

## 2022-10-05 RX ORDER — CIPROFLOXACIN HYDROCHLORIDE 500 MG/1
TAKE 1 TABLET TWICE DAILY TABLET, FILM COATED ORAL
Refills: 0 | Status: ON HOLD | COMMUNITY

## 2022-10-05 RX ORDER — POLYETHYLENE GLYCOL 3350, SODIUM CHLORIDE, SODIUM BICARBONATE AND POTASSIUM CHLORIDE WITH LEMON FLAVOR 420; 11.2; 5.72; 1.48 G/4L; G/4L; G/4L; G/4L
MIX CONTENTS PER PKG DIRECTIONS. DAY PRIOR TO PROCEDURE BEGIN DRINKING 1/2 SOULTION @ 5PM, COMPLETE  SECOND 1/2 6HR PRIOR TO PROCEDURE POWDER, FOR SOLUTION ORAL
Qty: 1 | Refills: 0 | Status: ON HOLD | COMMUNITY
Start: 2019-05-22

## 2022-10-05 RX ORDER — OMEGA-3S/DHA/EPA/FISH OIL/D3 300MG-1000
AS DIRECTED CAPSULE ORAL ONCE A DAY
Status: ACTIVE | COMMUNITY

## 2022-10-05 RX ORDER — COLESTIPOL HYDROCHLORIDE 1 G/1
1 TABLET TABLET, FILM COATED ORAL TWICE DAILY
Qty: 180 | Refills: 2 | OUTPATIENT
Start: 2022-10-05

## 2022-10-05 RX ORDER — VANCOMYCIN HYDROCHLORIDE 125 MG/1
TAKE 1 CAPSULE EVERY SIX HOURS FOR 10 DAYS CAPSULE ORAL
Qty: 40 | Refills: 0 | Status: ON HOLD | COMMUNITY
Start: 2019-05-16

## 2022-10-05 RX ORDER — HYOSCYAMINE SULFATE 0.12 MG/1
PLACE 1 TABLET EVERY 6 HOURS PRN TABLET, ORALLY DISINTEGRATING ORAL
Status: ON HOLD | COMMUNITY
Start: 2019-05-22

## 2022-10-05 RX ORDER — FLUCONAZOLE 150 MG/1
TAKE 1 TABLET BY MOUTH EVERY DAY FOR 5 DAYS TABLET ORAL
Qty: 5 | Refills: 0 | Status: ON HOLD | COMMUNITY
Start: 2019-05-25

## 2022-10-05 RX ORDER — OMEPRAZOLE 40 MG/1
TAKE 1 CAPSULE BY MOUTH EVERY DAY CAPSULE, DELAYED RELEASE ORAL
Qty: 30 CAPSULE | Refills: 5 | Status: ACTIVE | COMMUNITY

## 2022-10-05 RX ORDER — NITROFURANTOIN MONOHYDRATE/MACROCRYSTALLINE 25; 75 MG/1; MG/1
CAPSULE ORAL
Qty: 20 | Refills: 0 | Status: ON HOLD | COMMUNITY
Start: 2012-08-14

## 2022-10-05 RX ORDER — BENZONATATE 100 MG/1
CAPSULE ORAL
Qty: 30 | Refills: 0 | Status: ON HOLD | COMMUNITY
Start: 2013-06-25

## 2022-10-05 RX ORDER — FAMOTIDINE 20 MG/1
1 TABLET AT BEDTIME AS NEEDED TABLET, FILM COATED ORAL ONCE A DAY
Status: ON HOLD | COMMUNITY

## 2022-10-05 RX ORDER — ONDANSETRON HYDROCHLORIDE 4 MG/1
TAKE 1 TABLET BY MOUTH EVERY 8 HOURS AS NEEDED FOR NAUSEA OR VOMITING TABLET, FILM COATED ORAL
Qty: 30 | Refills: 3 | Status: ON HOLD | COMMUNITY
Start: 2019-05-22

## 2022-10-05 RX ORDER — NYSTATIN 100000 [USP'U]/G
APPLY TO AFFECTED AREA 3 TIMES A DAY POWDER TOPICAL
Qty: 15 | Refills: 0 | Status: ON HOLD | COMMUNITY
Start: 2019-05-25

## 2022-10-05 RX ORDER — MULTIVIT-MIN/IRON/FOLIC ACID/K 18-600-40
AS DIRECTED CAPSULE ORAL
Status: ON HOLD | COMMUNITY

## 2022-10-05 RX ORDER — TIRZEPATIDE 2.5 MG/.5ML
AS DIRECTED INJECTION, SOLUTION SUBCUTANEOUS
Status: ACTIVE | COMMUNITY

## 2022-10-05 RX ORDER — DICYCLOMINE HYDROCHLORIDE 20 MG/1
TAKE 1 TABLET EVERY 6-8 HOURS PRN TABLET ORAL
Refills: 0 | Status: ON HOLD | COMMUNITY

## 2022-10-05 RX ORDER — LEVOFLOXACIN 500 MG/1
TABLET, FILM COATED ORAL
Qty: 7 | Refills: 0 | Status: ON HOLD | COMMUNITY
Start: 2013-06-20

## 2022-10-05 RX ORDER — SIMVASTATIN 40 MG/1
1 TABLET IN THE EVENING TABLET, FILM COATED ORAL ONCE A DAY
Status: ACTIVE | COMMUNITY

## 2022-10-05 RX ORDER — ALBUTEROL SULFATE 90 UG/1
AEROSOL, METERED RESPIRATORY (INHALATION)
Qty: 18 | Refills: 0 | Status: ON HOLD | COMMUNITY
Start: 2012-03-06

## 2022-10-05 RX ORDER — OMEPRAZOLE 40 MG/1
TAKE 1 CAPSULE BY MOUTH EVERY DAY CAPSULE, DELAYED RELEASE ORAL
OUTPATIENT

## 2022-10-05 RX ORDER — METRONIDAZOLE 500 MG/1
TAKE 1 TABLET EVERY 8 HOURS TABLET, FILM COATED ORAL
Qty: 30 | Refills: 1 | Status: ON HOLD | COMMUNITY

## 2022-10-05 RX ORDER — DULOXETINE HYDROCHLORIDE 20 MG/1
1 CAPSULE CAPSULE, DELAYED RELEASE ORAL ONCE A DAY
Status: ACTIVE | COMMUNITY

## 2022-10-05 RX ORDER — AMOXICILLIN AND CLAVULANATE POTASSIUM 875; 125 MG/1; MG/1
TABLET, FILM COATED ORAL
Qty: 20 | Refills: 0 | Status: ON HOLD | COMMUNITY
Start: 2012-03-06

## 2022-10-05 NOTE — HPI-TODAY'S VISIT:
62-year-old female with a history of GERD, Garcia's esophagus and diarrhea presents for long interval follow-up.  She was last seen on 8/31/2021.  She had epigastric abdominal pain ongoing for 4 weeks.  This started around the same time starting Celebrex as well as losing her sister to a stroke.  Stress could be contributing.  She had stopped Celebrex and had some relief with use of Levsin.  Given that her symptoms were improving a conservative approach was favored.  Labs and CT imaging were normal.  If pain persisted we would consider EGD though she did have a normal EGD with exception of Garcia's esophagus noted in February 2021.  She was to continue omeprazole 40 mg once daily, famotidine 20 mg at night, and Levsin as needed.  Labs 8/9/2022: elevated BUN 19 otherwise normal CMP, elevated WBC 11.17, normal H/H, normal CRP, normal CPK.  She had a repeat EGD in [place] since her last visit which showed gastritis. She was placed on two antibiotics which resolved her epigastric pain. Her GERD is mostly controlled with Omeprazole 40 mg once daily. She may have breakthrough indigestion if she eats late at night. SHe denies dysphagia. She denies abdominal pain, nausea or vomiting. She does have chronic diarrhea following her gallbladder removal. This has since resolved after starting Mounjaro injections for weight loss. This has affected her appetite, and she has to "force herself to eat." She now has one formed bowel movement daily. She does have dark green stools when she uses Pepto Bismol. She denies bright red blood. She denies any other changes to her medical history.

## 2022-10-05 NOTE — HPI-OTHER HISTORIES
An EGD with dilatation was performed 2/24/2021.  LA grade a esophagitis.  Medium sized hiatal hernia.  Normal stomach.  Normal ampulla and examined duodenum.  Pathology showed squamocolumnar mucosa with acute and chronic inflammation and intestinal metaplasia.  There was no dysplasia identified.  Repeat EGD is recommended in 3 years.  Labs 8/11/21: WBC 10.08, H/H 12.6/39, MCV 87.4, Plt 317, BUN 13, Crt 0.9, Na 138, K 3.8, AST 21, ALT 14, ALP 53, Tbili 0.4, [id number]  CTAP with IV contrast 8/11/21: Normal pancreas. No acute abdominopelvic process.

## 2023-03-31 ENCOUNTER — ERX REFILL RESPONSE (OUTPATIENT)
Dept: URBAN - METROPOLITAN AREA CLINIC 113 | Facility: CLINIC | Age: 63
End: 2023-03-31

## 2023-03-31 RX ORDER — OMEPRAZOLE 40 MG/1
TAKE 1 CAPSULE BY MOUTH EVERY DAY CAPSULE, DELAYED RELEASE ORAL
OUTPATIENT

## 2023-03-31 RX ORDER — OMEPRAZOLE 40 MG/1
TAKE 1 CAPSULE BY MOUTH EVERY DAY CAPSULE, DELAYED RELEASE ORAL
Qty: 90 | Refills: 3 | OUTPATIENT

## 2023-04-05 ENCOUNTER — OFFICE VISIT (OUTPATIENT)
Dept: URBAN - METROPOLITAN AREA CLINIC 113 | Facility: CLINIC | Age: 63
End: 2023-04-05

## 2023-04-05 ENCOUNTER — OFFICE VISIT (OUTPATIENT)
Dept: URBAN - METROPOLITAN AREA CLINIC 113 | Facility: CLINIC | Age: 63
End: 2023-04-05
Payer: COMMERCIAL

## 2023-04-05 VITALS
HEART RATE: 87 BPM | BODY MASS INDEX: 27.56 KG/M2 | WEIGHT: 165.4 LBS | TEMPERATURE: 97.7 F | DIASTOLIC BLOOD PRESSURE: 89 MMHG | SYSTOLIC BLOOD PRESSURE: 132 MMHG | HEIGHT: 65 IN | RESPIRATION RATE: 16 BRPM

## 2023-04-05 DIAGNOSIS — K21.9 GASTROESOPHAGEAL REFLUX DISEASE, UNSPECIFIED WHETHER ESOPHAGITIS PRESENT: ICD-10-CM

## 2023-04-05 DIAGNOSIS — K52.9 CHRONIC DIARRHEA: ICD-10-CM

## 2023-04-05 DIAGNOSIS — K22.70 BARRETT'S ESOPHAGUS WITHOUT DYSPLASIA: ICD-10-CM

## 2023-04-05 DIAGNOSIS — Z12.11 COLON CANCER SCREENING: ICD-10-CM

## 2023-04-05 DIAGNOSIS — R10.13 EPIGASTRIC ABDOMINAL PAIN: ICD-10-CM

## 2023-04-05 PROCEDURE — 99213 OFFICE O/P EST LOW 20 MIN: CPT | Performed by: NURSE PRACTITIONER

## 2023-04-05 RX ORDER — METRONIDAZOLE 500 MG/1
TAKE 1 TABLET EVERY 8 HOURS TABLET, FILM COATED ORAL
Qty: 30 | Refills: 1 | Status: ON HOLD | COMMUNITY

## 2023-04-05 RX ORDER — POLYETHYLENE GLYCOL 3350, SODIUM CHLORIDE, SODIUM BICARBONATE AND POTASSIUM CHLORIDE WITH LEMON FLAVOR 420; 11.2; 5.72; 1.48 G/4L; G/4L; G/4L; G/4L
MIX CONTENTS PER PKG DIRECTIONS. DAY PRIOR TO PROCEDURE BEGIN DRINKING 1/2 SOULTION @ 5PM, COMPLETE  SECOND 1/2 6HR PRIOR TO PROCEDURE POWDER, FOR SOLUTION ORAL
Qty: 1 | Refills: 0 | Status: ON HOLD | COMMUNITY
Start: 2019-05-22

## 2023-04-05 RX ORDER — DULOXETINE HYDROCHLORIDE 20 MG/1
1 CAPSULE CAPSULE, DELAYED RELEASE ORAL ONCE A DAY
Status: ACTIVE | COMMUNITY

## 2023-04-05 RX ORDER — ZOLEDRONIC ACID 5 MG/100ML
INJECTION, SOLUTION INTRAVENOUS
Qty: 100 | Refills: 0 | Status: ON HOLD | COMMUNITY
Start: 2012-05-29

## 2023-04-05 RX ORDER — HYOSCYAMINE SULFATE 0.12 MG/1
PLACE 1 TABLET EVERY 6 HOURS PRN TABLET, ORALLY DISINTEGRATING ORAL
Status: ON HOLD | COMMUNITY
Start: 2019-05-22

## 2023-04-05 RX ORDER — OMEPRAZOLE 40 MG/1
TAKE 1 CAPSULE BY MOUTH EVERY DAY CAPSULE, DELAYED RELEASE ORAL
Qty: 90 | Refills: 3 | Status: ACTIVE | COMMUNITY

## 2023-04-05 RX ORDER — ALBUTEROL SULFATE 90 UG/1
AEROSOL, METERED RESPIRATORY (INHALATION)
Qty: 18 | Refills: 0 | Status: ON HOLD | COMMUNITY
Start: 2012-03-06

## 2023-04-05 RX ORDER — NYSTATIN 100000 [USP'U]/ML
SWISH AND SWALLOW 15 MLS (1 TABLESPOONFUL) BY MOUTH 4 TIMES A DAY SUSPENSION ORAL
Qty: 420 | Refills: 0 | Status: ON HOLD | COMMUNITY
Start: 2019-05-25

## 2023-04-05 RX ORDER — MULTIVIT-MIN/IRON/FOLIC ACID/K 18-600-40
AS DIRECTED CAPSULE ORAL
Status: ON HOLD | COMMUNITY

## 2023-04-05 RX ORDER — NYSTATIN 100000 [USP'U]/G
APPLY TO AFFECTED AREA 3 TIMES A DAY POWDER TOPICAL
Qty: 15 | Refills: 0 | Status: ON HOLD | COMMUNITY
Start: 2019-05-25

## 2023-04-05 RX ORDER — FLUCONAZOLE 150 MG/1
TAKE 1 TABLET BY MOUTH EVERY DAY FOR 5 DAYS TABLET ORAL
Qty: 5 | Refills: 0 | Status: ON HOLD | COMMUNITY
Start: 2019-05-25

## 2023-04-05 RX ORDER — VANCOMYCIN HYDROCHLORIDE 125 MG/1
TAKE 1 CAPSULE EVERY SIX HOURS FOR 10 DAYS CAPSULE ORAL
Qty: 40 | Refills: 0 | Status: ON HOLD | COMMUNITY
Start: 2019-05-16

## 2023-04-05 RX ORDER — TIRZEPATIDE 2.5 MG/.5ML
AS DIRECTED INJECTION, SOLUTION SUBCUTANEOUS
Status: ACTIVE | COMMUNITY

## 2023-04-05 RX ORDER — PROMETHAZINE HYDROCHLORIDE AND DEXTROMETHORPHAN HYDROBROMIDE 6.25; 15 MG/5ML; MG/5ML
SOLUTION ORAL
Qty: 180 | Refills: 0 | Status: ON HOLD | COMMUNITY
Start: 2012-03-06

## 2023-04-05 RX ORDER — BENZONATATE 100 MG/1
CAPSULE ORAL
Qty: 30 | Refills: 0 | Status: ON HOLD | COMMUNITY
Start: 2013-06-25

## 2023-04-05 RX ORDER — NITROFURANTOIN MONOHYDRATE/MACROCRYSTALLINE 25; 75 MG/1; MG/1
CAPSULE ORAL
Qty: 20 | Refills: 0 | Status: ON HOLD | COMMUNITY
Start: 2012-08-14

## 2023-04-05 RX ORDER — SIMVASTATIN 40 MG/1
1 TABLET IN THE EVENING TABLET, FILM COATED ORAL ONCE A DAY
Status: ACTIVE | COMMUNITY

## 2023-04-05 RX ORDER — DICYCLOMINE HYDROCHLORIDE 20 MG/1
TAKE 1 TABLET EVERY 6-8 HOURS PRN TABLET ORAL
Refills: 0 | Status: ON HOLD | COMMUNITY

## 2023-04-05 RX ORDER — CIPROFLOXACIN HYDROCHLORIDE 500 MG/1
TAKE 1 TABLET TWICE DAILY TABLET, FILM COATED ORAL
Refills: 0 | Status: ON HOLD | COMMUNITY

## 2023-04-05 RX ORDER — AMOXICILLIN AND CLAVULANATE POTASSIUM 875; 125 MG/1; MG/1
TABLET, FILM COATED ORAL
Qty: 20 | Refills: 0 | Status: ON HOLD | COMMUNITY
Start: 2012-03-06

## 2023-04-05 RX ORDER — OMEPRAZOLE 40 MG/1
TAKE 1 CAPSULE BY MOUTH EVERY DAY CAPSULE, DELAYED RELEASE ORAL ONCE A DAY
Qty: 90 | Refills: 3 | OUTPATIENT

## 2023-04-05 RX ORDER — FAMOTIDINE 20 MG/1
1 TABLET AT BEDTIME AS NEEDED TABLET, FILM COATED ORAL ONCE A DAY
Status: ON HOLD | COMMUNITY

## 2023-04-05 RX ORDER — LEVOFLOXACIN 500 MG/1
TABLET, FILM COATED ORAL
Qty: 7 | Refills: 0 | Status: ON HOLD | COMMUNITY
Start: 2013-06-20

## 2023-04-05 RX ORDER — OMEGA-3S/DHA/EPA/FISH OIL/D3 300MG-1000
AS DIRECTED CAPSULE ORAL ONCE A DAY
Status: ACTIVE | COMMUNITY

## 2023-04-05 RX ORDER — COLESTIPOL HYDROCHLORIDE 1 G/1
1 TABLET TABLET, FILM COATED ORAL TWICE DAILY
Qty: 180 | Refills: 2 | Status: ON HOLD | COMMUNITY
Start: 2022-10-05

## 2023-04-05 RX ORDER — COLESTIPOL HYDROCHLORIDE 1 G/1
1 TABLET TABLET, FILM COATED ORAL TWICE DAILY
Qty: 180 | Refills: 2 | OUTPATIENT

## 2023-04-05 RX ORDER — ONDANSETRON HYDROCHLORIDE 4 MG/1
TAKE 1 TABLET BY MOUTH EVERY 8 HOURS AS NEEDED FOR NAUSEA OR VOMITING TABLET, FILM COATED ORAL
Qty: 30 | Refills: 3 | Status: ON HOLD | COMMUNITY
Start: 2019-05-22

## 2023-04-05 NOTE — HPI-TODAY'S VISIT:
This is a 62-year-old woman with a history of Garcia's esophagus, epigastric abdominal pain, GERD, chronic diarrhea, presenting for 6-month follow-up. She was seen in the office in October 2022.  She described a repeat EGD in [place] revealing gastritis for which she was placed on 2 antibiotics for her epigastric pain.  GERD symptoms were controlled with omeprazole 40 mg daily.  She did describe breakthrough indigestion following late-night meals.  She was recommended to continue omeprazole and begin lifestyle modifications for GERD.  She was recommended a trial of FD guard as needed.  She was noted to be due for repeat surveillance EGD in 2024.  Regarding her chronic diarrhea with negative random colon biopsies in 2019 she was recommended a trial of bile acid binder if diarrhea recurred.  Colonoscopy was due in 2029 for screening purposes.   Patient is without any abdominal complaints. No dysphagia, heartburn, regurgitation, unintentional weight loss, nausea, vomiting, hematemesis or melena. Bowels are moving regularly without blood per rectum. No complaints of bloating. No jaundice, icterus.

## 2023-12-23 ENCOUNTER — ERX REFILL RESPONSE (OUTPATIENT)
Dept: URBAN - METROPOLITAN AREA CLINIC 113 | Facility: CLINIC | Age: 63
End: 2023-12-23

## 2023-12-23 RX ORDER — OMEPRAZOLE 40 MG/1
TAKE 1 CAPSULE BY MOUTH EVERY DAY CAPSULE, DELAYED RELEASE ORAL
Qty: 90 | Refills: 3 | OUTPATIENT

## 2024-04-24 ENCOUNTER — OV EP (OUTPATIENT)
Dept: URBAN - METROPOLITAN AREA CLINIC 113 | Facility: CLINIC | Age: 64
End: 2024-04-24
Payer: COMMERCIAL

## 2024-04-24 ENCOUNTER — LAB (OUTPATIENT)
Dept: URBAN - METROPOLITAN AREA CLINIC 113 | Facility: CLINIC | Age: 64
End: 2024-04-24

## 2024-04-24 VITALS
HEIGHT: 65 IN | SYSTOLIC BLOOD PRESSURE: 137 MMHG | DIASTOLIC BLOOD PRESSURE: 95 MMHG | WEIGHT: 180 LBS | RESPIRATION RATE: 18 BRPM | BODY MASS INDEX: 29.99 KG/M2 | TEMPERATURE: 97.7 F | HEART RATE: 87 BPM

## 2024-04-24 DIAGNOSIS — R10.84 GENERALIZED ABDOMINAL PAIN: ICD-10-CM

## 2024-04-24 DIAGNOSIS — K22.70 BARRETT'S ESOPHAGUS WITHOUT DYSPLASIA: ICD-10-CM

## 2024-04-24 DIAGNOSIS — K21.9 GASTROESOPHAGEAL REFLUX DISEASE, UNSPECIFIED WHETHER ESOPHAGITIS PRESENT: ICD-10-CM

## 2024-04-24 DIAGNOSIS — K52.9 CHRONIC DIARRHEA: ICD-10-CM

## 2024-04-24 DIAGNOSIS — Z12.11 COLON CANCER SCREENING: ICD-10-CM

## 2024-04-24 PROBLEM — 79922009: Status: ACTIVE | Noted: 2024-04-24

## 2024-04-24 PROBLEM — 102614006: Status: ACTIVE | Noted: 2024-04-24

## 2024-04-24 PROBLEM — 236071009: Status: ACTIVE | Noted: 2024-04-24

## 2024-04-24 PROCEDURE — 99213 OFFICE O/P EST LOW 20 MIN: CPT | Performed by: INTERNAL MEDICINE

## 2024-04-24 RX ORDER — MULTIVIT-MIN/IRON/FOLIC ACID/K 18-600-40
AS DIRECTED CAPSULE ORAL
Status: ON HOLD | COMMUNITY

## 2024-04-24 RX ORDER — VANCOMYCIN HYDROCHLORIDE 125 MG/1
TAKE 1 CAPSULE EVERY SIX HOURS FOR 10 DAYS CAPSULE ORAL
Qty: 40 | Refills: 0 | Status: ON HOLD | COMMUNITY
Start: 2019-05-16

## 2024-04-24 RX ORDER — COLESTIPOL HYDROCHLORIDE 1 G/1
1 TABLET TABLET, FILM COATED ORAL TWICE DAILY
Qty: 180 | Refills: 2 | OUTPATIENT

## 2024-04-24 RX ORDER — ZOLEDRONIC ACID 5 MG/100ML
INJECTION, SOLUTION INTRAVENOUS
Qty: 100 | Refills: 0 | Status: ON HOLD | COMMUNITY
Start: 2012-05-29

## 2024-04-24 RX ORDER — ONDANSETRON HYDROCHLORIDE 4 MG/1
TAKE 1 TABLET BY MOUTH EVERY 8 HOURS AS NEEDED FOR NAUSEA OR VOMITING TABLET, FILM COATED ORAL
Qty: 30 | Refills: 3 | Status: ON HOLD | COMMUNITY
Start: 2019-05-22

## 2024-04-24 RX ORDER — NITROFURANTOIN MONOHYDRATE/MACROCRYSTALLINE 25; 75 MG/1; MG/1
CAPSULE ORAL
Qty: 20 | Refills: 0 | Status: ON HOLD | COMMUNITY
Start: 2012-08-14

## 2024-04-24 RX ORDER — PROMETHAZINE HYDROCHLORIDE AND DEXTROMETHORPHAN HYDROBROMIDE 6.25; 15 MG/5ML; MG/5ML
SOLUTION ORAL
Qty: 180 | Refills: 0 | Status: ON HOLD | COMMUNITY
Start: 2012-03-06

## 2024-04-24 RX ORDER — NYSTATIN 100000 [USP'U]/ML
SWISH AND SWALLOW 15 MLS (1 TABLESPOONFUL) BY MOUTH 4 TIMES A DAY SUSPENSION ORAL
Qty: 420 | Refills: 0 | Status: ON HOLD | COMMUNITY
Start: 2019-05-25

## 2024-04-24 RX ORDER — HYOSCYAMINE SULFATE 0.12 MG/1
PLACE 1 TABLET EVERY 6 HOURS PRN TABLET, ORALLY DISINTEGRATING ORAL
Status: ON HOLD | COMMUNITY
Start: 2019-05-22

## 2024-04-24 RX ORDER — LEVOFLOXACIN 500 MG/1
TABLET, FILM COATED ORAL
Qty: 7 | Refills: 0 | Status: ON HOLD | COMMUNITY
Start: 2013-06-20

## 2024-04-24 RX ORDER — BENZONATATE 100 MG/1
CAPSULE ORAL
Qty: 30 | Refills: 0 | Status: ON HOLD | COMMUNITY
Start: 2013-06-25

## 2024-04-24 RX ORDER — OMEPRAZOLE 40 MG/1
TAKE 1 CAPSULE BY MOUTH EVERY DAY CAPSULE, DELAYED RELEASE ORAL
Qty: 90 | Refills: 3 | Status: ACTIVE | COMMUNITY

## 2024-04-24 RX ORDER — DICYCLOMINE HYDROCHLORIDE 20 MG/1
TAKE 1 TABLET EVERY 6-8 HOURS PRN TABLET ORAL
Refills: 0 | Status: ON HOLD | COMMUNITY

## 2024-04-24 RX ORDER — NYSTATIN 100000 [USP'U]/G
APPLY TO AFFECTED AREA 3 TIMES A DAY POWDER TOPICAL
Qty: 15 | Refills: 0 | Status: ON HOLD | COMMUNITY
Start: 2019-05-25

## 2024-04-24 RX ORDER — SIMVASTATIN 40 MG/1
1 TABLET IN THE EVENING TABLET, FILM COATED ORAL ONCE A DAY
Status: ACTIVE | COMMUNITY

## 2024-04-24 RX ORDER — OMEGA-3S/DHA/EPA/FISH OIL/D3 300MG-1000
AS DIRECTED CAPSULE ORAL ONCE A DAY
Status: ACTIVE | COMMUNITY

## 2024-04-24 RX ORDER — FAMOTIDINE 20 MG/1
1 TABLET AT BEDTIME AS NEEDED TABLET, FILM COATED ORAL ONCE A DAY
Status: ON HOLD | COMMUNITY

## 2024-04-24 RX ORDER — FLUCONAZOLE 150 MG/1
TAKE 1 TABLET BY MOUTH EVERY DAY FOR 5 DAYS TABLET ORAL
Qty: 5 | Refills: 0 | Status: ON HOLD | COMMUNITY
Start: 2019-05-25

## 2024-04-24 RX ORDER — OMEPRAZOLE 40 MG/1
TAKE 1 CAPSULE BY MOUTH EVERY DAY CAPSULE, DELAYED RELEASE ORAL ONCE A DAY
Qty: 90 | Refills: 3 | OUTPATIENT

## 2024-04-24 RX ORDER — ALBUTEROL SULFATE 90 UG/1
AEROSOL, METERED RESPIRATORY (INHALATION)
Qty: 18 | Refills: 0 | Status: ON HOLD | COMMUNITY
Start: 2012-03-06

## 2024-04-24 RX ORDER — DULOXETINE HYDROCHLORIDE 20 MG/1
1 CAPSULE CAPSULE, DELAYED RELEASE ORAL ONCE A DAY
Status: ACTIVE | COMMUNITY

## 2024-04-24 RX ORDER — CIPROFLOXACIN HYDROCHLORIDE 500 MG/1
TAKE 1 TABLET TWICE DAILY TABLET, FILM COATED ORAL
Refills: 0 | Status: ON HOLD | COMMUNITY

## 2024-04-24 RX ORDER — METRONIDAZOLE 500 MG/1
TAKE 1 TABLET EVERY 8 HOURS TABLET, FILM COATED ORAL
Qty: 30 | Refills: 1 | Status: ON HOLD | COMMUNITY

## 2024-04-24 RX ORDER — TIRZEPATIDE 2.5 MG/.5ML
AS DIRECTED INJECTION, SOLUTION SUBCUTANEOUS
Status: ON HOLD | COMMUNITY

## 2024-04-24 RX ORDER — COLESTIPOL HYDROCHLORIDE 1 G/1
1 TABLET TABLET, FILM COATED ORAL TWICE DAILY
Qty: 180 | Refills: 2 | Status: ON HOLD | COMMUNITY

## 2024-04-24 RX ORDER — AMOXICILLIN AND CLAVULANATE POTASSIUM 875; 125 MG/1; MG/1
TABLET, FILM COATED ORAL
Qty: 20 | Refills: 0 | Status: ON HOLD | COMMUNITY
Start: 2012-03-06

## 2024-04-24 RX ORDER — POLYETHYLENE GLYCOL 3350, SODIUM CHLORIDE, SODIUM BICARBONATE AND POTASSIUM CHLORIDE WITH LEMON FLAVOR 420; 11.2; 5.72; 1.48 G/4L; G/4L; G/4L; G/4L
MIX CONTENTS PER PKG DIRECTIONS. DAY PRIOR TO PROCEDURE BEGIN DRINKING 1/2 SOULTION @ 5PM, COMPLETE  SECOND 1/2 6HR PRIOR TO PROCEDURE POWDER, FOR SOLUTION ORAL
Qty: 1 | Refills: 0 | Status: ON HOLD | COMMUNITY
Start: 2019-05-22

## 2024-04-24 NOTE — HPI-TODAY'S VISIT:
This is a 63-year-old woman with a history of Garcia's esophagus, epigastric abdominal pain, GERD, chronic diarrhea, presenting for annual folow up. She was seen in the office in October 2022.  She described a repeat EGD in [place] revealing gastritis for which she was placed on 2 antibiotics for her epigastric pain.  GERD symptoms were controlled with omeprazole 40 mg daily.  She did describe breakthrough indigestion following late-night meals.  She was recommended to continue omeprazole and begin lifestyle modifications for GERD.  She was recommended a trial of FD guard as needed.  She was noted to be due for repeat surveillance EGD in 2024.  Regarding her chronic diarrhea with negative random colon biopsies in 2019 she was recommended a trial of bile acid binder if diarrhea recurred.  Colonoscopy is due in 2029 for screening purposes.  Overall she is doing well.  She has no heartburn and takes omeprazole 40 mg p.o. daily.  There is no dysphagia.  She does get some abdominal pains both upper and mostly lower abdominal cramps.  She has loose stools about 5/day rarely she will have some nocturnal diarrhea.  There is been no blood or melena.  Sometimes she can see black stools however this usually lasts a day or 2.  She has had no nausea or vomiting.  There is no fevers or chills.  She does occasionally use ibuprofen.  Blood work on 1/10/2024 revealed a sodium 137 potassium 4.8, BUN 15 creatinine 0.9.  AST 26, ALT 22, alk phosphatase 51, total bili 0.6.  CK 64.  Hemoglobin 11.6, WBC 7.58 and platelet count 374,000.

## 2024-04-24 NOTE — HPI-OTHER HISTORIES
CT scan of the abdomen pelvis with contrast on 3/18/2024 revealed a normal liver, gallbladder, pancreas and spleen.  There is colonic diverticulosis without inflammation.  CTAP with IV contrast 8/11/21: Normal pancreas. No acute abdominopelvic process.  EGD on 2/24/2021 showed 3 tongues of Garcia's mucosa 33 to 34 cm with mild nodularity.  Biopsies revealed squamocolumnar mucosa with inflammation and intestinal metaplasia but no dysplasia.  LA grade A esophagitis.  Medium sized hiatal hernia.  Normal stomach.  Normal ampulla and examined duodenum.  Colonoscopy was performed 7/18/2019 showuing normal ileum, nonbleeding small grade 2 internal hemorrhoids, diverticulosis of left colon with fixed tortuous left colon with muscular hypertrophy, normal mucosa in the entire examined colon and biopsies showed no significnat abnormlaity, and removal of a 5 mm hyperplastic polyp from the descending colon.

## 2024-07-25 ENCOUNTER — OFFICE VISIT (OUTPATIENT)
Dept: URBAN - METROPOLITAN AREA MEDICAL CENTER 2 | Facility: MEDICAL CENTER | Age: 64
End: 2024-07-25

## 2024-08-19 PROBLEM — 266433003: Status: ACTIVE | Noted: 2024-08-19

## 2024-08-20 ENCOUNTER — DASHBOARD ENCOUNTERS (OUTPATIENT)
Age: 64
End: 2024-08-20

## 2024-08-20 ENCOUNTER — OFFICE VISIT (OUTPATIENT)
Dept: URBAN - METROPOLITAN AREA CLINIC 113 | Facility: CLINIC | Age: 64
End: 2024-08-20
Payer: COMMERCIAL

## 2024-08-20 VITALS
WEIGHT: 176.4 LBS | DIASTOLIC BLOOD PRESSURE: 96 MMHG | BODY MASS INDEX: 29.39 KG/M2 | SYSTOLIC BLOOD PRESSURE: 143 MMHG | RESPIRATION RATE: 14 BRPM | TEMPERATURE: 96.8 F | HEIGHT: 65 IN | HEART RATE: 73 BPM

## 2024-08-20 DIAGNOSIS — R10.84 GENERALIZED ABDOMINAL PAIN: ICD-10-CM

## 2024-08-20 DIAGNOSIS — K21.00 GASTROESOPHAGEAL REFLUX DISEASE WITH ESOPHAGITIS WITHOUT HEMORRHAGE: ICD-10-CM

## 2024-08-20 DIAGNOSIS — K22.70 BARRETT'S ESOPHAGUS WITHOUT DYSPLASIA: ICD-10-CM

## 2024-08-20 DIAGNOSIS — K52.89 (LYMPHOCYTIC) MICROSCOPIC COLITIS: ICD-10-CM

## 2024-08-20 PROCEDURE — 99213 OFFICE O/P EST LOW 20 MIN: CPT | Performed by: INTERNAL MEDICINE

## 2024-08-20 RX ORDER — OMEPRAZOLE 40 MG/1
TAKE 1 CAPSULE BY MOUTH EVERY DAY CAPSULE, DELAYED RELEASE ORAL
Qty: 90 | Refills: 3 | Status: ON HOLD | COMMUNITY

## 2024-08-20 RX ORDER — LEVOFLOXACIN 500 MG/1
TABLET, FILM COATED ORAL
Qty: 7 | Refills: 0 | Status: ON HOLD | COMMUNITY
Start: 2013-06-20

## 2024-08-20 RX ORDER — SULFAMETHOXAZOLE AND TRIMETHOPRIM 800; 160 MG/1; MG/1
1 TABLET TABLET ORAL
Status: ACTIVE | COMMUNITY

## 2024-08-20 RX ORDER — COLESTIPOL HYDROCHLORIDE 1 G/1
1 TABLET TABLET, FILM COATED ORAL TWICE DAILY
Qty: 180 | Refills: 2 | OUTPATIENT

## 2024-08-20 RX ORDER — NITROFURANTOIN MONOHYDRATE/MACROCRYSTALLINE 25; 75 MG/1; MG/1
CAPSULE ORAL
Qty: 20 | Refills: 0 | Status: ON HOLD | COMMUNITY
Start: 2012-08-14

## 2024-08-20 RX ORDER — NYSTATIN 100000 [USP'U]/G
APPLY TO AFFECTED AREA 3 TIMES A DAY POWDER TOPICAL
Qty: 15 | Refills: 0 | Status: ON HOLD | COMMUNITY
Start: 2019-05-25

## 2024-08-20 RX ORDER — ZOLEDRONIC ACID 5 MG/100ML
INJECTION, SOLUTION INTRAVENOUS
Qty: 100 | Refills: 0 | Status: ON HOLD | COMMUNITY
Start: 2012-05-29

## 2024-08-20 RX ORDER — DULOXETINE HYDROCHLORIDE 20 MG/1
1 CAPSULE CAPSULE, DELAYED RELEASE ORAL ONCE A DAY
Status: ACTIVE | COMMUNITY

## 2024-08-20 RX ORDER — OMEGA-3S/DHA/EPA/FISH OIL/D3 300MG-1000
AS DIRECTED CAPSULE ORAL ONCE A DAY
Status: ACTIVE | COMMUNITY

## 2024-08-20 RX ORDER — METRONIDAZOLE 500 MG/1
TAKE 1 TABLET EVERY 8 HOURS TABLET, FILM COATED ORAL
Qty: 30 | Refills: 1 | Status: ON HOLD | COMMUNITY

## 2024-08-20 RX ORDER — OMEPRAZOLE 40 MG/1
TAKE 1 CAPSULE BY MOUTH EVERY DAY CAPSULE, DELAYED RELEASE ORAL ONCE A DAY
Qty: 90 | Refills: 3 | Status: ACTIVE | COMMUNITY

## 2024-08-20 RX ORDER — FLUCONAZOLE 150 MG/1
TAKE 1 TABLET BY MOUTH EVERY DAY FOR 5 DAYS TABLET ORAL
Qty: 5 | Refills: 0 | Status: ON HOLD | COMMUNITY
Start: 2019-05-25

## 2024-08-20 RX ORDER — NYSTATIN 100000 [USP'U]/ML
SWISH AND SWALLOW 15 MLS (1 TABLESPOONFUL) BY MOUTH 4 TIMES A DAY SUSPENSION ORAL
Qty: 420 | Refills: 0 | Status: ON HOLD | COMMUNITY
Start: 2019-05-25

## 2024-08-20 RX ORDER — SIMVASTATIN 40 MG/1
1 TABLET IN THE EVENING TABLET, FILM COATED ORAL ONCE A DAY
Status: ACTIVE | COMMUNITY

## 2024-08-20 RX ORDER — TIRZEPATIDE 2.5 MG/.5ML
AS DIRECTED INJECTION, SOLUTION SUBCUTANEOUS
Status: ON HOLD | COMMUNITY

## 2024-08-20 RX ORDER — OMEPRAZOLE 40 MG/1
1 CAPSULE 30 MINUTES BEFORE MEAL CAPSULE, DELAYED RELEASE ORAL TWICE DAILY
Qty: 60 | Refills: 2 | OUTPATIENT
Start: 2024-08-20

## 2024-08-20 RX ORDER — VANCOMYCIN HYDROCHLORIDE 125 MG/1
TAKE 1 CAPSULE EVERY SIX HOURS FOR 10 DAYS CAPSULE ORAL
Qty: 40 | Refills: 0 | Status: ON HOLD | COMMUNITY
Start: 2019-05-16

## 2024-08-20 RX ORDER — COLESTIPOL HYDROCHLORIDE 1 G/1
1 TABLET TABLET, FILM COATED ORAL TWICE DAILY
Qty: 180 | Refills: 2 | Status: ACTIVE | COMMUNITY

## 2024-08-20 RX ORDER — ALBUTEROL SULFATE 90 UG/1
AEROSOL, METERED RESPIRATORY (INHALATION)
Qty: 18 | Refills: 0 | Status: ON HOLD | COMMUNITY
Start: 2012-03-06

## 2024-08-20 RX ORDER — FAMOTIDINE 20 MG/1
1 TABLET AT BEDTIME AS NEEDED TABLET, FILM COATED ORAL ONCE A DAY
Status: ON HOLD | COMMUNITY

## 2024-08-20 RX ORDER — MULTIVIT-MIN/IRON/FOLIC ACID/K 18-600-40
AS DIRECTED CAPSULE ORAL
Status: ON HOLD | COMMUNITY

## 2024-08-20 RX ORDER — PROMETHAZINE HYDROCHLORIDE AND DEXTROMETHORPHAN HYDROBROMIDE 6.25; 15 MG/5ML; MG/5ML
SOLUTION ORAL
Qty: 180 | Refills: 0 | Status: ON HOLD | COMMUNITY
Start: 2012-03-06

## 2024-08-20 RX ORDER — ONDANSETRON HYDROCHLORIDE 4 MG/1
TAKE 1 TABLET BY MOUTH EVERY 8 HOURS AS NEEDED FOR NAUSEA OR VOMITING TABLET, FILM COATED ORAL
Qty: 30 | Refills: 3 | Status: ON HOLD | COMMUNITY
Start: 2019-05-22

## 2024-08-20 RX ORDER — CIPROFLOXACIN HYDROCHLORIDE 500 MG/1
TAKE 1 TABLET TWICE DAILY TABLET, FILM COATED ORAL
Refills: 0 | Status: ON HOLD | COMMUNITY

## 2024-08-20 RX ORDER — HYOSCYAMINE SULFATE 0.12 MG/1
PLACE 1 TABLET EVERY 6 HOURS PRN TABLET, ORALLY DISINTEGRATING ORAL
Status: ON HOLD | COMMUNITY
Start: 2019-05-22

## 2024-08-20 RX ORDER — POLYETHYLENE GLYCOL 3350, SODIUM CHLORIDE, SODIUM BICARBONATE AND POTASSIUM CHLORIDE WITH LEMON FLAVOR 420; 11.2; 5.72; 1.48 G/4L; G/4L; G/4L; G/4L
MIX CONTENTS PER PKG DIRECTIONS. DAY PRIOR TO PROCEDURE BEGIN DRINKING 1/2 SOULTION @ 5PM, COMPLETE  SECOND 1/2 6HR PRIOR TO PROCEDURE POWDER, FOR SOLUTION ORAL
Qty: 1 | Refills: 0 | Status: ON HOLD | COMMUNITY
Start: 2019-05-22

## 2024-08-20 RX ORDER — DICYCLOMINE HYDROCHLORIDE 20 MG/1
TAKE 1 TABLET EVERY 6-8 HOURS PRN TABLET ORAL
Refills: 0 | Status: ON HOLD | COMMUNITY

## 2024-08-20 RX ORDER — BENZONATATE 100 MG/1
CAPSULE ORAL
Qty: 30 | Refills: 0 | Status: ON HOLD | COMMUNITY
Start: 2013-06-25

## 2024-08-20 RX ORDER — AMOXICILLIN AND CLAVULANATE POTASSIUM 875; 125 MG/1; MG/1
TABLET, FILM COATED ORAL
Qty: 20 | Refills: 0 | Status: ON HOLD | COMMUNITY
Start: 2012-03-06

## 2024-08-20 NOTE — HPI-TODAY'S VISIT:
This is a 63-year-old woman with a history of Garcia's esophagus, epigastric abdominal pain, GERD, chronic diarrhea, presenting for annual folow up. She was seen in the office in October 2022.  She described a repeat EGD in [place] revealing gastritis for which she was placed on 2 antibiotics for her epigastric pain.  GERD symptoms were controlled with omeprazole 40 mg daily.  She did describe breakthrough indigestion following late-night meals.  She was recommended to continue omeprazole and begin lifestyle modifications for GERD.  She was recommended a trial of FD guard as needed.  She was noted to be due for repeat surveillance EGD in 2024.  Regarding her chronic diarrhea with negative random colon biopsies in 2019 she was recommended a trial of bile acid binder if diarrhea recurred.  Colonoscopy is due in 2029 for screening purposes.  She currently is having kidney stones as well as a urinary tract infection and was started on Bactrim.  She denies any NSAIDs.  She is taking her omeprazole 40 mg p.o. twice a day since her endoscopy.  She has no heartburn or dysphagia.  There is no abdominal pain.  She has some nausea and vomiting with her kidney stones but otherwise has none.  She moves her bowels about 2-3 times per day they are usually loose there is been no blood or melena.  She rarely can see a dark stool.  This usually is just 1 episode.  She is not taking colestipol.  Blood work on 1/10/2024 revealed a sodium 137 potassium 4.8, BUN 15 creatinine 0.9.  AST 26, ALT 22, alk phosphatase 51, total bili 0.6.  CK 64.  Hemoglobin 11.6, WBC 7.58 and platelet count 374,000.

## 2024-08-20 NOTE — HPI-OTHER HISTORIES
EGD on 7/25/2024 revealed 2 inlet patches of gastric mucosa in the proximal esophagus.  LA grade B esophagitis.  3 tongues of Garcia's mucosa from 33 to 34 cm with some nodularity and edema and mild narrowing.  Biopsies revealed inflammation and erosions negative for intestinal metaplasia or dysplasia.  There is a 2 cm hiatal hernia.  There is gastritis of the gastric antrum biopsies revealed moderate chronic active gastritis with erosions negative for H. pylori.  The duodenum was normal and biopsies revealed normal duodenum.  CT scan of the abdomen pelvis with contrast on 3/18/2024 revealed a normal liver, gallbladder, pancreas and spleen.  There is colonic diverticulosis without inflammation.  CTAP with IV contrast 8/11/21: Normal pancreas. No acute abdominopelvic process.  EGD on 2/24/2021 showed 3 tongues of Garcia's mucosa 33 to 34 cm with mild nodularity.  Biopsies revealed squamocolumnar mucosa with inflammation and intestinal metaplasia but no dysplasia.  LA grade A esophagitis.  Medium sized hiatal hernia.  Normal stomach.  Normal ampulla and examined duodenum.  Colonoscopy was performed 7/18/2019 showuing normal ileum, nonbleeding small grade 2 internal hemorrhoids, diverticulosis of left colon with fixed tortuous left colon with muscular hypertrophy, normal mucosa in the entire examined colon and biopsies showed no significnat abnormlaity, and removal of a 5 mm hyperplastic polyp from the descending colon.

## 2024-10-07 ENCOUNTER — LAB OUTSIDE AN ENCOUNTER (OUTPATIENT)
Dept: URBAN - METROPOLITAN AREA CLINIC 113 | Facility: CLINIC | Age: 64
End: 2024-10-07

## 2024-10-09 ENCOUNTER — OFFICE VISIT (OUTPATIENT)
Dept: URBAN - METROPOLITAN AREA MEDICAL CENTER 2 | Facility: MEDICAL CENTER | Age: 64
End: 2024-10-09

## 2024-10-22 ENCOUNTER — TELEPHONE ENCOUNTER (OUTPATIENT)
Dept: URBAN - METROPOLITAN AREA CLINIC 113 | Facility: CLINIC | Age: 64
End: 2024-10-22

## 2024-11-14 ENCOUNTER — OFFICE VISIT (OUTPATIENT)
Dept: URBAN - METROPOLITAN AREA CLINIC 107 | Facility: CLINIC | Age: 64
End: 2024-11-14
Payer: COMMERCIAL

## 2024-11-14 VITALS — BODY MASS INDEX: 30.19 KG/M2 | HEIGHT: 65 IN | TEMPERATURE: 97.4 F | WEIGHT: 181.2 LBS

## 2024-11-14 DIAGNOSIS — R10.84 GENERALIZED ABDOMINAL PAIN: ICD-10-CM

## 2024-11-14 DIAGNOSIS — K52.9 CHRONIC DIARRHEA: ICD-10-CM

## 2024-11-14 DIAGNOSIS — K22.70 BARRETT'S ESOPHAGUS WITHOUT DYSPLASIA: ICD-10-CM

## 2024-11-14 DIAGNOSIS — K21.00 GASTROESOPHAGEAL REFLUX DISEASE WITH ESOPHAGITIS WITHOUT HEMORRHAGE: ICD-10-CM

## 2024-11-14 PROCEDURE — 99212 OFFICE O/P EST SF 10 MIN: CPT | Performed by: INTERNAL MEDICINE

## 2024-11-14 RX ORDER — OMEPRAZOLE 40 MG/1
1 CAPSULE 30 MINUTES BEFORE MEAL CAPSULE, DELAYED RELEASE ORAL TWICE DAILY
Qty: 60 | Refills: 2 | OUTPATIENT

## 2024-11-14 RX ORDER — SIMVASTATIN 40 MG/1
1 TABLET IN THE EVENING TABLET, FILM COATED ORAL ONCE A DAY
Status: ACTIVE | COMMUNITY

## 2024-11-14 RX ORDER — NYSTATIN 100000 [USP'U]/G
APPLY TO AFFECTED AREA 3 TIMES A DAY POWDER TOPICAL
Qty: 15 | Refills: 0 | Status: ON HOLD | COMMUNITY
Start: 2019-05-25

## 2024-11-14 RX ORDER — AMOXICILLIN AND CLAVULANATE POTASSIUM 875; 125 MG/1; MG/1
TABLET, FILM COATED ORAL
Qty: 20 | Refills: 0 | Status: ON HOLD | COMMUNITY
Start: 2012-03-06

## 2024-11-14 RX ORDER — COLESTIPOL HYDROCHLORIDE 1 G/1
1 TABLET TABLET, FILM COATED ORAL TWICE DAILY
Qty: 180 | Refills: 2 | Status: ON HOLD | COMMUNITY

## 2024-11-14 RX ORDER — PROMETHAZINE HYDROCHLORIDE AND DEXTROMETHORPHAN HYDROBROMIDE 6.25; 15 MG/5ML; MG/5ML
SOLUTION ORAL
Qty: 180 | Refills: 0 | Status: ON HOLD | COMMUNITY
Start: 2012-03-06

## 2024-11-14 RX ORDER — ONDANSETRON HYDROCHLORIDE 4 MG/1
TAKE 1 TABLET BY MOUTH EVERY 8 HOURS AS NEEDED FOR NAUSEA OR VOMITING TABLET, FILM COATED ORAL
Qty: 30 | Refills: 3 | Status: ON HOLD | COMMUNITY
Start: 2019-05-22

## 2024-11-14 RX ORDER — OMEPRAZOLE 40 MG/1
1 CAPSULE 30 MINUTES BEFORE MEAL CAPSULE, DELAYED RELEASE ORAL TWICE DAILY
Qty: 60 | Refills: 2 | Status: ACTIVE | COMMUNITY
Start: 2024-08-20

## 2024-11-14 RX ORDER — LEVOFLOXACIN 500 MG/1
TABLET, FILM COATED ORAL
Qty: 7 | Refills: 0 | Status: ON HOLD | COMMUNITY
Start: 2013-06-20

## 2024-11-14 RX ORDER — DULOXETINE HYDROCHLORIDE 20 MG/1
1 CAPSULE CAPSULE, DELAYED RELEASE ORAL ONCE A DAY
Status: ACTIVE | COMMUNITY

## 2024-11-14 RX ORDER — VANCOMYCIN HYDROCHLORIDE 125 MG/1
TAKE 1 CAPSULE EVERY SIX HOURS FOR 10 DAYS CAPSULE ORAL
Qty: 40 | Refills: 0 | Status: ON HOLD | COMMUNITY
Start: 2019-05-16

## 2024-11-14 RX ORDER — COLESTIPOL HYDROCHLORIDE 1 G/1
1 TABLET TABLET, FILM COATED ORAL TWICE DAILY
Qty: 180 | Refills: 2 | OUTPATIENT

## 2024-11-14 RX ORDER — ALBUTEROL SULFATE 90 UG/1
AEROSOL, METERED RESPIRATORY (INHALATION)
Qty: 18 | Refills: 0 | Status: ON HOLD | COMMUNITY
Start: 2012-03-06

## 2024-11-14 RX ORDER — OMEGA-3S/DHA/EPA/FISH OIL/D3 300MG-1000
AS DIRECTED CAPSULE ORAL ONCE A DAY
Status: ACTIVE | COMMUNITY

## 2024-11-14 RX ORDER — FLUCONAZOLE 150 MG/1
TAKE 1 TABLET BY MOUTH EVERY DAY FOR 5 DAYS TABLET ORAL
Qty: 5 | Refills: 0 | Status: ON HOLD | COMMUNITY
Start: 2019-05-25

## 2024-11-14 RX ORDER — CIPROFLOXACIN HYDROCHLORIDE 500 MG/1
TAKE 1 TABLET TWICE DAILY TABLET, FILM COATED ORAL
Refills: 0 | Status: ON HOLD | COMMUNITY

## 2024-11-14 RX ORDER — OMEPRAZOLE 40 MG/1
TAKE 1 CAPSULE BY MOUTH EVERY DAY CAPSULE, DELAYED RELEASE ORAL
Qty: 90 | Refills: 3 | Status: ON HOLD | COMMUNITY

## 2024-11-14 RX ORDER — NYSTATIN 100000 [USP'U]/ML
SWISH AND SWALLOW 15 MLS (1 TABLESPOONFUL) BY MOUTH 4 TIMES A DAY SUSPENSION ORAL
Qty: 420 | Refills: 0 | Status: ON HOLD | COMMUNITY
Start: 2019-05-25

## 2024-11-14 RX ORDER — SULFAMETHOXAZOLE AND TRIMETHOPRIM 800; 160 MG/1; MG/1
1 TABLET TABLET ORAL
Status: ON HOLD | COMMUNITY

## 2024-11-14 RX ORDER — DICYCLOMINE HYDROCHLORIDE 20 MG/1
TAKE 1 TABLET EVERY 6-8 HOURS PRN TABLET ORAL
Refills: 0 | Status: ON HOLD | COMMUNITY

## 2024-11-14 RX ORDER — BENZONATATE 100 MG/1
CAPSULE ORAL
Qty: 30 | Refills: 0 | Status: ON HOLD | COMMUNITY
Start: 2013-06-25

## 2024-11-14 RX ORDER — ZOLEDRONIC ACID 5 MG/100ML
INJECTION, SOLUTION INTRAVENOUS
Qty: 100 | Refills: 0 | Status: ON HOLD | COMMUNITY
Start: 2012-05-29

## 2024-11-14 RX ORDER — NITROFURANTOIN MONOHYDRATE/MACROCRYSTALLINE 25; 75 MG/1; MG/1
CAPSULE ORAL
Qty: 20 | Refills: 0 | Status: ON HOLD | COMMUNITY
Start: 2012-08-14

## 2024-11-14 RX ORDER — FAMOTIDINE 20 MG/1
1 TABLET AT BEDTIME AS NEEDED TABLET, FILM COATED ORAL ONCE A DAY
Status: ON HOLD | COMMUNITY

## 2024-11-14 RX ORDER — POLYETHYLENE GLYCOL 3350, SODIUM CHLORIDE, SODIUM BICARBONATE AND POTASSIUM CHLORIDE WITH LEMON FLAVOR 420; 11.2; 5.72; 1.48 G/4L; G/4L; G/4L; G/4L
MIX CONTENTS PER PKG DIRECTIONS. DAY PRIOR TO PROCEDURE BEGIN DRINKING 1/2 SOULTION @ 5PM, COMPLETE  SECOND 1/2 6HR PRIOR TO PROCEDURE POWDER, FOR SOLUTION ORAL
Qty: 1 | Refills: 0 | Status: ON HOLD | COMMUNITY
Start: 2019-05-22

## 2024-11-14 RX ORDER — TIRZEPATIDE 2.5 MG/.5ML
AS DIRECTED INJECTION, SOLUTION SUBCUTANEOUS
Status: ON HOLD | COMMUNITY

## 2024-11-14 RX ORDER — METRONIDAZOLE 500 MG/1
TAKE 1 TABLET EVERY 8 HOURS TABLET, FILM COATED ORAL
Qty: 30 | Refills: 1 | Status: ON HOLD | COMMUNITY

## 2024-11-14 RX ORDER — MULTIVIT-MIN/IRON/FOLIC ACID/K 18-600-40
AS DIRECTED CAPSULE ORAL
Status: ON HOLD | COMMUNITY

## 2024-11-14 RX ORDER — HYOSCYAMINE SULFATE 0.12 MG/1
PLACE 1 TABLET EVERY 6 HOURS PRN TABLET, ORALLY DISINTEGRATING ORAL
Status: ON HOLD | COMMUNITY
Start: 2019-05-22

## 2024-11-14 NOTE — HPI-OTHER HISTORIES
EGD on 10/9/2024 revealed a inlet patch of gastric mucosa in the proximal esophagus.  There is Garcia's esophagus from 33 to 34 cm.  C0 M1 [place] criteria.  There was some erythema and edema and mild nodularity.  Biopsies revealed squamocolumnar epithelium with chronic inflammation consistent with reflux no intestinal metaplasia or dysplasia.  The previous esophagitis appeared healed.  There is a 3 cm hiatal hernia.  There is erythema of the gastric antrum and body biopsies revealed mild chronic gastritis negative for H. pylori.  The duodenum was normal.  EGD on 7/25/2024 revealed 2 inlet patches of gastric mucosa in the proximal esophagus.  LA grade B esophagitis.  3 tongues of Garcia's mucosa from 33 to 34 cm with some nodularity and edema and mild narrowing.  Biopsies revealed inflammation and erosions negative for intestinal metaplasia or dysplasia.  There is a 2 cm hiatal hernia.  There is gastritis of the gastric antrum biopsies revealed moderate chronic active gastritis with erosions negative for H. pylori.  The duodenum was normal and biopsies revealed normal duodenum.  CT scan of the abdomen pelvis with contrast on 3/18/2024 revealed a normal liver, gallbladder, pancreas and spleen.  There is colonic diverticulosis without inflammation.  CTAP with IV contrast 8/11/21: Normal pancreas. No acute abdominopelvic process.  EGD on 2/24/2021 showed 3 tongues of Garcia's mucosa 33 to 34 cm with mild nodularity.  Biopsies revealed squamocolumnar mucosa with inflammation and intestinal metaplasia but no dysplasia.  LA grade A esophagitis.  Medium sized hiatal hernia.  Normal stomach.  Normal ampulla and examined duodenum.  Colonoscopy was performed 7/18/2019 showuing normal ileum, nonbleeding small grade 2 internal hemorrhoids, diverticulosis of left colon with fixed tortuous left colon with muscular hypertrophy, normal mucosa in the entire examined colon and biopsies showed no significnat abnormlaity, and removal of a 5 mm hyperplastic polyp from the descending colon.

## 2024-11-14 NOTE — HPI-TODAY'S VISIT:
This is a 64-year-old woman with a history of Garcia's esophagus, epigastric abdominal pain, GERD, chronic diarrhea, presenting for annual folow up. She was seen in the office in October 2022.  She described a repeat EGD in [place] revealing gastritis for which she was placed on 2 antibiotics for her epigastric pain.  GERD symptoms were controlled with omeprazole 40 mg daily.  She did describe breakthrough indigestion following late-night meals.  She was recommended to continue omeprazole and begin lifestyle modifications for GERD.  She was recommended a trial of FD guard as needed.  She was noted to be due for repeat surveillance EGD in 2024.  Regarding her chronic diarrhea with negative random colon biopsies in 2019 she was recommended a trial of bile acid binder if diarrhea recurred.  Colonoscopy is due in 2029 for screening purposes.  She is doing very well.  As long as she takes her omeprazole twice a day she has no breakthrough symptoms.  If she misses the evening dose she will have heartburn when she lies down.  There is no dysphagia or abdominal pain.  There is no nausea or vomiting.  She has 2-3 soft to loose stools a day without blood or melena.  Her weight has been stable.  Blood work on 1/10/2024 revealed a sodium 137 potassium 4.8, BUN 15 creatinine 0.9.  AST 26, ALT 22, alk phosphatase 51, total bili 0.6.  CK 64.  Hemoglobin 11.6, WBC 7.58 and platelet count 374,000.

## 2025-02-10 ENCOUNTER — TELEPHONE ENCOUNTER (OUTPATIENT)
Dept: URBAN - METROPOLITAN AREA CLINIC 113 | Facility: CLINIC | Age: 65
End: 2025-02-10

## 2025-02-14 ENCOUNTER — LAB OUTSIDE AN ENCOUNTER (OUTPATIENT)
Dept: URBAN - METROPOLITAN AREA CLINIC 113 | Facility: CLINIC | Age: 65
End: 2025-02-14

## 2025-04-03 ENCOUNTER — TELEPHONE ENCOUNTER (OUTPATIENT)
Dept: URBAN - METROPOLITAN AREA CLINIC 107 | Facility: CLINIC | Age: 65
End: 2025-04-03

## 2025-04-08 ENCOUNTER — ERX REFILL RESPONSE (OUTPATIENT)
Dept: URBAN - METROPOLITAN AREA CLINIC 113 | Facility: CLINIC | Age: 65
End: 2025-04-08

## 2025-04-08 RX ORDER — OMEPRAZOLE 40 MG/1
1 CAPSULE 30 MINUTES BEFORE MEAL CAPSULE, DELAYED RELEASE ORAL TWICE DAILY
Qty: 60 | Refills: 2

## 2025-04-18 ENCOUNTER — OFFICE VISIT (OUTPATIENT)
Dept: URBAN - METROPOLITAN AREA MEDICAL CENTER 2 | Facility: MEDICAL CENTER | Age: 65
End: 2025-04-18
Payer: COMMERCIAL

## 2025-04-18 DIAGNOSIS — R19.7 ACUTE DIARRHEA: ICD-10-CM

## 2025-04-18 DIAGNOSIS — Z87.19 ESOPHAGEAL FOOD BOLUS: ICD-10-CM

## 2025-04-18 DIAGNOSIS — K20.80 ABSCESS OF ESOPHAGUS: ICD-10-CM

## 2025-04-18 DIAGNOSIS — K29.60 ADENOPAPILLOMATOSIS GASTRICA: ICD-10-CM

## 2025-04-18 PROCEDURE — 43239 EGD BIOPSY SINGLE/MULTIPLE: CPT | Performed by: INTERNAL MEDICINE

## 2025-04-21 ENCOUNTER — TELEPHONE ENCOUNTER (OUTPATIENT)
Dept: URBAN - METROPOLITAN AREA CLINIC 107 | Facility: CLINIC | Age: 65
End: 2025-04-21

## 2025-04-22 ENCOUNTER — OFFICE VISIT (OUTPATIENT)
Dept: URBAN - METROPOLITAN AREA CLINIC 113 | Facility: CLINIC | Age: 65
End: 2025-04-22

## 2025-04-22 RX ORDER — FLUCONAZOLE 150 MG/1
TAKE 1 TABLET BY MOUTH EVERY DAY FOR 5 DAYS TABLET ORAL
Qty: 5 | Refills: 0 | Status: ON HOLD | COMMUNITY
Start: 2019-05-25

## 2025-04-22 RX ORDER — SULFAMETHOXAZOLE AND TRIMETHOPRIM 800; 160 MG/1; MG/1
1 TABLET TABLET ORAL
Status: ON HOLD | COMMUNITY

## 2025-04-22 RX ORDER — NYSTATIN 100000 [USP'U]/G
APPLY TO AFFECTED AREA 3 TIMES A DAY POWDER TOPICAL
Qty: 15 | Refills: 0 | Status: ON HOLD | COMMUNITY
Start: 2019-05-25

## 2025-04-22 RX ORDER — FAMOTIDINE 20 MG/1
1 TABLET AT BEDTIME AS NEEDED TABLET, FILM COATED ORAL ONCE A DAY
Status: ON HOLD | COMMUNITY

## 2025-04-22 RX ORDER — BENZONATATE 100 MG/1
CAPSULE ORAL
Qty: 30 | Refills: 0 | Status: ON HOLD | COMMUNITY
Start: 2013-06-25

## 2025-04-22 RX ORDER — ZOLEDRONIC ACID 5 MG/100ML
INJECTION, SOLUTION INTRAVENOUS
Qty: 100 | Refills: 0 | Status: ON HOLD | COMMUNITY
Start: 2012-05-29

## 2025-04-22 RX ORDER — OMEPRAZOLE 40 MG/1
1 CAPSULE 30 MINUTES BEFORE MEAL CAPSULE, DELAYED RELEASE ORAL TWICE DAILY
Qty: 60 | Refills: 2 | Status: ACTIVE | COMMUNITY

## 2025-04-22 RX ORDER — ALBUTEROL SULFATE 90 UG/1
AEROSOL, METERED RESPIRATORY (INHALATION)
Qty: 18 | Refills: 0 | Status: ON HOLD | COMMUNITY
Start: 2012-03-06

## 2025-04-22 RX ORDER — METRONIDAZOLE 500 MG/1
TAKE 1 TABLET EVERY 8 HOURS TABLET, FILM COATED ORAL
Qty: 30 | Refills: 1 | Status: ON HOLD | COMMUNITY

## 2025-04-22 RX ORDER — LEVOFLOXACIN 500 MG/1
TABLET, FILM COATED ORAL
Qty: 7 | Refills: 0 | Status: ON HOLD | COMMUNITY
Start: 2013-06-20

## 2025-04-22 RX ORDER — TIRZEPATIDE 2.5 MG/.5ML
AS DIRECTED INJECTION, SOLUTION SUBCUTANEOUS
Status: ON HOLD | COMMUNITY

## 2025-04-22 RX ORDER — CIPROFLOXACIN HYDROCHLORIDE 500 MG/1
TAKE 1 TABLET TWICE DAILY TABLET, FILM COATED ORAL
Refills: 0 | Status: ON HOLD | COMMUNITY

## 2025-04-22 RX ORDER — VANCOMYCIN HYDROCHLORIDE 125 MG/1
TAKE 1 CAPSULE EVERY SIX HOURS FOR 10 DAYS CAPSULE ORAL
Qty: 40 | Refills: 0 | Status: ON HOLD | COMMUNITY
Start: 2019-05-16

## 2025-04-22 RX ORDER — ONDANSETRON HYDROCHLORIDE 4 MG/1
TAKE 1 TABLET BY MOUTH EVERY 8 HOURS AS NEEDED FOR NAUSEA OR VOMITING TABLET, FILM COATED ORAL
Qty: 30 | Refills: 3 | Status: ON HOLD | COMMUNITY
Start: 2019-05-22

## 2025-04-22 RX ORDER — DULOXETINE HYDROCHLORIDE 20 MG/1
1 CAPSULE CAPSULE, DELAYED RELEASE ORAL ONCE A DAY
Status: ACTIVE | COMMUNITY

## 2025-04-22 RX ORDER — POLYETHYLENE GLYCOL 3350, SODIUM CHLORIDE, SODIUM BICARBONATE AND POTASSIUM CHLORIDE WITH LEMON FLAVOR 420; 11.2; 5.72; 1.48 G/4L; G/4L; G/4L; G/4L
MIX CONTENTS PER PKG DIRECTIONS. DAY PRIOR TO PROCEDURE BEGIN DRINKING 1/2 SOULTION @ 5PM, COMPLETE  SECOND 1/2 6HR PRIOR TO PROCEDURE POWDER, FOR SOLUTION ORAL
Qty: 1 | Refills: 0 | Status: ON HOLD | COMMUNITY
Start: 2019-05-22

## 2025-04-22 RX ORDER — NYSTATIN 100000 [USP'U]/ML
SWISH AND SWALLOW 15 MLS (1 TABLESPOONFUL) BY MOUTH 4 TIMES A DAY SUSPENSION ORAL
Qty: 420 | Refills: 0 | Status: ON HOLD | COMMUNITY
Start: 2019-05-25

## 2025-04-22 RX ORDER — DICYCLOMINE HYDROCHLORIDE 20 MG/1
TAKE 1 TABLET EVERY 6-8 HOURS PRN TABLET ORAL
Refills: 0 | Status: ON HOLD | COMMUNITY

## 2025-04-22 RX ORDER — SIMVASTATIN 40 MG/1
1 TABLET IN THE EVENING TABLET, FILM COATED ORAL ONCE A DAY
Status: ACTIVE | COMMUNITY

## 2025-04-22 RX ORDER — MULTIVIT-MIN/IRON/FOLIC ACID/K 18-600-40
AS DIRECTED CAPSULE ORAL
Status: ON HOLD | COMMUNITY

## 2025-04-22 RX ORDER — COLESTIPOL HYDROCHLORIDE 1 G/1
1 TABLET TABLET, FILM COATED ORAL TWICE DAILY
Qty: 180 | Refills: 2 | Status: ACTIVE | COMMUNITY

## 2025-04-22 RX ORDER — NITROFURANTOIN 25; 75 MG/1; MG/1
CAPSULE ORAL
Qty: 20 | Refills: 0 | Status: ON HOLD | COMMUNITY
Start: 2012-08-14

## 2025-04-22 RX ORDER — PROMETHAZINE HYDROCHLORIDE AND DEXTROMETHORPHAN HYDROBROMIDE 6.25; 15 MG/5ML; MG/5ML
SOLUTION ORAL
Qty: 180 | Refills: 0 | Status: ON HOLD | COMMUNITY
Start: 2012-03-06

## 2025-04-22 RX ORDER — OMEGA-3S/DHA/EPA/FISH OIL/D3 300MG-1000
AS DIRECTED CAPSULE ORAL ONCE A DAY
Status: ACTIVE | COMMUNITY

## 2025-04-22 RX ORDER — AMOXICILLIN AND CLAVULANATE POTASSIUM 875; 125 MG/1; MG/1
TABLET, FILM COATED ORAL
Qty: 20 | Refills: 0 | Status: ON HOLD | COMMUNITY
Start: 2012-03-06

## 2025-04-22 RX ORDER — HYOSCYAMINE SULFATE 0.12 MG/1
PLACE 1 TABLET EVERY 6 HOURS PRN TABLET, ORALLY DISINTEGRATING ORAL
Status: ON HOLD | COMMUNITY
Start: 2019-05-22

## 2025-04-22 NOTE — HPI-TODAY'S VISIT:
This is a 64-year-old woman with a history of Garcia's esophagus, epigastric abdominal pain, GERD, chronic diarrhea, presenting for annual folow up. She was seen in the office in October 2022.  She described a repeat EGD in [place] revealing gastritis for which she was placed on 2 antibiotics for her epigastric pain.  GERD symptoms were controlled with omeprazole 40 mg daily.  She did describe breakthrough indigestion following late-night meals.  She was recommended to continue omeprazole and begin lifestyle modifications for GERD.  She was recommended a trial of FD guard as needed.  She was noted to be due for repeat surveillance EGD in 2024.  Regarding her chronic diarrhea with negative random colon biopsies in 2019 she was recommended a trial of bile acid binder if diarrhea recurred.  Colonoscopy is due in 2029 for screening purposes.  She is doing very well.  As long as she takes her omeprazole twice a day she has no breakthrough symptoms.  If she misses the evening dose she will have heartburn when she lies down.  There is no dysphagia or abdominal pain.  There is no nausea or vomiting.  She has 2-3 soft to loose stools a day without blood or melena.  Her weight has been stable.  Blood work on 1/10/2024 revealed a sodium 137 potassium 4.8, BUN 15 creatinine 0.9.  AST 26, ALT 22, alk phosphatase 51, total bili 0.6.  CK 64.  Hemoglobin 11.6, WBC 7.58 and platelet count 374,000.

## 2025-04-22 NOTE — HPI-OTHER HISTORIES
EGD on 10/9/2024 revealed a inlet patch of gastric mucosa in the proximal esophagus.  There is Garcia's esophagus from 33 to 34 cm.  C0 M1 [place] criteria.  There was some erythema and edema and mild nodularity.  Biopsies revealed squamocolumnar epithelium with chronic inflammation consistent with reflux no intestinal metaplasia or dysplasia.  The previous esophagitis appeared healed.  There is a 3 cm hiatal hernia.  There is erythema of the gastric antrum and body biopsies revealed mild chronic gastritis negative for H. pylori.  The duodenum was normal.  EGD on 7/25/2024 revealed 2 inlet patches of gastric mucosa in the proximal esophagus.  LA grade B esophagitis.  3 tongues of Garcia's mucosa from 33 to 34 cm with some nodularity and edema and mild narrowing.  Biopsies revealed inflammation and erosions negative for intestinal metaplasia or dysplasia.  There is a 2 cm hiatal hernia.  There is gastritis of the gastric antrum biopsies revealed moderate chronic active gastritis with erosions negative for H. pylori.  The duodenum was normal and biopsies revealed normal duodenum.  CT scan of the abdomen pelvis with contrast on 3/18/2024 revealed a normal liver, gallbladder, pancreas and spleen.  There is colonic diverticulosis without inflammation.  CTAP with IV contrast 8/11/21: Normal pancreas. No acute abdominopelvic process.  EGD on 2/24/2021 showed 3 tongues of Garcia's mucosa 33 to 34 cm with mild nodularity.  Biopsies revealed squamocolumnar mucosa with inflammation and intestinal metaplasia but no dysplasia.  LA grade A esophagitis.  Medium sized hiatal hernia.  Normal stomach.  Normal ampulla and examined duodenum.  Colonoscopy was performed 7/18/2019 showuing normal ileum, nonbleeding small grade 2 internal hemorrhoids, diverticulosis of left colon with fixed tortuous left colon with muscular hypertrophy, normal mucosa in the entire examined colon and biopsies showed no significnat abnormlaity, and removal of a 5 mm hyperplastic polyp from the descending colon. Recommend frequent moist compresses to the eyes.    Start erythromycin and trial ofloxacin eyedrops as prescribed.    May use over-the-counter nasal Flonase for sinus and ear symptoms.   Follow up with primary care provider in 5-7 days if not improving or seek care sooner if new or worsening symptoms.

## 2025-05-08 NOTE — PHYSICAL EXAM EYES:
Conjuntivae and eyelids appear normal, Sclerae : White without injection Scheduled procedure with Patient at  [phone] (home)     Scheduled Via: Case Request Order for  [id number]    Did patient request a different provider then the referred to:No    Procedure date: 5/15     Procedure time: TBD ; Did patient request this specific time? No    If a MAC patient is scheduled, patient was notified a family member/friend will need to stay with the patient over night  once they return home after their procedure: Yes    Was patient's demographics verified, (address,phone number email)  Yes    Insurance name confirmed as [employer] MED[employer]ARE ADVANTAGE , will be the same at time of procedure?: Yes  Prep required? Yes, Pharmacy is [id number] [address]  AT [facility] [address]  ; was request sent to nurse to send prep to pharmacy? Yes;     Was prep instructions briefly reviewed? Yes;

## 2025-08-08 ENCOUNTER — ERX REFILL RESPONSE (OUTPATIENT)
Dept: URBAN - METROPOLITAN AREA CLINIC 113 | Facility: CLINIC | Age: 65
End: 2025-08-08

## 2025-08-08 RX ORDER — OMEPRAZOLE 40 MG/1
TAKE 1 CAPSULE BY MOUTH TWICE DAILY 30 MINUTES BEFORE MEAL CAPSULE, DELAYED RELEASE ORAL
Qty: 60 CAPSULE | Refills: 3